# Patient Record
Sex: MALE | Race: WHITE | NOT HISPANIC OR LATINO | URBAN - METROPOLITAN AREA
[De-identification: names, ages, dates, MRNs, and addresses within clinical notes are randomized per-mention and may not be internally consistent; named-entity substitution may affect disease eponyms.]

---

## 2015-08-05 RX ORDER — ALLOPURINOL 300 MG
3 TABLET ORAL
Qty: 0 | Refills: 0 | COMMUNITY
Start: 2015-08-05

## 2015-08-05 RX ORDER — RISPERIDONE 4 MG/1
1 TABLET ORAL
Qty: 0 | Refills: 0 | COMMUNITY
Start: 2015-08-05

## 2017-12-28 ENCOUNTER — INPATIENT (INPATIENT)
Facility: HOSPITAL | Age: 82
LOS: 11 days | Discharge: HOME | DRG: 281 | End: 2018-01-09
Attending: INTERNAL MEDICINE | Admitting: INTERNAL MEDICINE
Payer: COMMERCIAL

## 2017-12-28 VITALS
OXYGEN SATURATION: 96 % | RESPIRATION RATE: 19 BRPM | SYSTOLIC BLOOD PRESSURE: 144 MMHG | DIASTOLIC BLOOD PRESSURE: 75 MMHG | HEART RATE: 51 BPM

## 2017-12-28 DIAGNOSIS — I21.4 NON-ST ELEVATION (NSTEMI) MYOCARDIAL INFARCTION: ICD-10-CM

## 2017-12-28 LAB
ALBUMIN SERPL ELPH-MCNC: 3.5 G/DL — SIGNIFICANT CHANGE UP (ref 3.3–5)
ALP SERPL-CCNC: 69 U/L — SIGNIFICANT CHANGE UP (ref 40–120)
ALT FLD-CCNC: 12 U/L RC — SIGNIFICANT CHANGE UP (ref 10–45)
ANION GAP SERPL CALC-SCNC: 10 MMOL/L — SIGNIFICANT CHANGE UP (ref 5–17)
AST SERPL-CCNC: 23 U/L — SIGNIFICANT CHANGE UP (ref 10–40)
BASOPHILS # BLD AUTO: 0 K/UL — SIGNIFICANT CHANGE UP (ref 0–0.2)
BASOPHILS NFR BLD AUTO: 0.3 % — SIGNIFICANT CHANGE UP (ref 0–2)
BILIRUB SERPL-MCNC: 1.2 MG/DL — SIGNIFICANT CHANGE UP (ref 0.2–1.2)
BUN SERPL-MCNC: 10 MG/DL — SIGNIFICANT CHANGE UP (ref 7–23)
CALCIUM SERPL-MCNC: 9.1 MG/DL — SIGNIFICANT CHANGE UP (ref 8.4–10.5)
CHLORIDE SERPL-SCNC: 99 MMOL/L — SIGNIFICANT CHANGE UP (ref 96–108)
CO2 SERPL-SCNC: 28 MMOL/L — SIGNIFICANT CHANGE UP (ref 22–31)
CREAT SERPL-MCNC: 0.63 MG/DL — SIGNIFICANT CHANGE UP (ref 0.5–1.3)
EOSINOPHIL # BLD AUTO: 0 K/UL — SIGNIFICANT CHANGE UP (ref 0–0.5)
EOSINOPHIL NFR BLD AUTO: 0.4 % — SIGNIFICANT CHANGE UP (ref 0–6)
GLUCOSE SERPL-MCNC: 169 MG/DL — HIGH (ref 70–99)
HCT VFR BLD CALC: 41.2 % — SIGNIFICANT CHANGE UP (ref 39–50)
HGB BLD-MCNC: 13.6 G/DL — SIGNIFICANT CHANGE UP (ref 13–17)
LYMPHOCYTES # BLD AUTO: 1.6 K/UL — SIGNIFICANT CHANGE UP (ref 1–3.3)
LYMPHOCYTES # BLD AUTO: 15.5 % — SIGNIFICANT CHANGE UP (ref 13–44)
MCHC RBC-ENTMCNC: 32.5 PG — SIGNIFICANT CHANGE UP (ref 27–34)
MCHC RBC-ENTMCNC: 33.1 GM/DL — SIGNIFICANT CHANGE UP (ref 32–36)
MCV RBC AUTO: 98.2 FL — SIGNIFICANT CHANGE UP (ref 80–100)
MONOCYTES # BLD AUTO: 0.7 K/UL — SIGNIFICANT CHANGE UP (ref 0–0.9)
MONOCYTES NFR BLD AUTO: 6.9 % — SIGNIFICANT CHANGE UP (ref 2–14)
NEUTROPHILS # BLD AUTO: 8 K/UL — HIGH (ref 1.8–7.4)
NEUTROPHILS NFR BLD AUTO: 76.9 % — SIGNIFICANT CHANGE UP (ref 43–77)
PLATELET # BLD AUTO: 274 K/UL — SIGNIFICANT CHANGE UP (ref 150–400)
POTASSIUM SERPL-MCNC: 4.6 MMOL/L — SIGNIFICANT CHANGE UP (ref 3.5–5.3)
POTASSIUM SERPL-SCNC: 4.6 MMOL/L — SIGNIFICANT CHANGE UP (ref 3.5–5.3)
PROT SERPL-MCNC: 6.3 G/DL — SIGNIFICANT CHANGE UP (ref 6–8.3)
RBC # BLD: 4.19 M/UL — LOW (ref 4.2–5.8)
RBC # FLD: 15 % — HIGH (ref 10.3–14.5)
SODIUM SERPL-SCNC: 137 MMOL/L — SIGNIFICANT CHANGE UP (ref 135–145)
TROPONIN T SERPL-MCNC: 0.03 NG/ML — SIGNIFICANT CHANGE UP (ref 0–0.06)
TROPONIN T SERPL-MCNC: 0.12 NG/ML — HIGH (ref 0–0.06)
WBC # BLD: 10.4 K/UL — SIGNIFICANT CHANGE UP (ref 3.8–10.5)
WBC # FLD AUTO: 10.4 K/UL — SIGNIFICANT CHANGE UP (ref 3.8–10.5)

## 2017-12-28 PROCEDURE — 99223 1ST HOSP IP/OBS HIGH 75: CPT

## 2017-12-28 PROCEDURE — 93010 ELECTROCARDIOGRAM REPORT: CPT

## 2017-12-28 PROCEDURE — 71010: CPT | Mod: 26

## 2017-12-28 PROCEDURE — 99497 ADVNCD CARE PLAN 30 MIN: CPT | Mod: 25

## 2017-12-28 PROCEDURE — 99285 EMERGENCY DEPT VISIT HI MDM: CPT | Mod: 25

## 2017-12-28 RX ORDER — HEPARIN SODIUM 5000 [USP'U]/ML
5000 INJECTION INTRAVENOUS; SUBCUTANEOUS ONCE
Qty: 0 | Refills: 0 | Status: COMPLETED | OUTPATIENT
Start: 2017-12-28 | End: 2017-12-28

## 2017-12-28 RX ORDER — HEPARIN SODIUM 5000 [USP'U]/ML
6000 INJECTION INTRAVENOUS; SUBCUTANEOUS EVERY 6 HOURS
Qty: 0 | Refills: 0 | Status: DISCONTINUED | OUTPATIENT
Start: 2017-12-28 | End: 2018-01-01

## 2017-12-28 RX ORDER — HEPARIN SODIUM 5000 [USP'U]/ML
INJECTION INTRAVENOUS; SUBCUTANEOUS
Qty: 25000 | Refills: 0 | Status: DISCONTINUED | OUTPATIENT
Start: 2017-12-28 | End: 2018-01-01

## 2017-12-28 RX ORDER — ATORVASTATIN CALCIUM 80 MG/1
80 TABLET, FILM COATED ORAL AT BEDTIME
Qty: 0 | Refills: 0 | Status: DISCONTINUED | OUTPATIENT
Start: 2017-12-28 | End: 2018-01-09

## 2017-12-28 RX ORDER — ASPIRIN/CALCIUM CARB/MAGNESIUM 324 MG
325 TABLET ORAL ONCE
Qty: 0 | Refills: 0 | Status: COMPLETED | OUTPATIENT
Start: 2017-12-28 | End: 2017-12-28

## 2017-12-28 RX ORDER — CLOPIDOGREL BISULFATE 75 MG/1
300 TABLET, FILM COATED ORAL ONCE
Qty: 0 | Refills: 0 | Status: COMPLETED | OUTPATIENT
Start: 2017-12-28 | End: 2017-12-28

## 2017-12-28 RX ORDER — ALBUTEROL 90 UG/1
5 AEROSOL, METERED ORAL ONCE
Qty: 0 | Refills: 0 | Status: COMPLETED | OUTPATIENT
Start: 2017-12-28 | End: 2017-12-28

## 2017-12-28 RX ORDER — METOPROLOL TARTRATE 50 MG
12.5 TABLET ORAL
Qty: 0 | Refills: 0 | Status: DISCONTINUED | OUTPATIENT
Start: 2017-12-28 | End: 2017-12-28

## 2017-12-28 RX ADMIN — HEPARIN SODIUM 1000 UNIT(S)/HR: 5000 INJECTION INTRAVENOUS; SUBCUTANEOUS at 20:19

## 2017-12-28 RX ADMIN — ATORVASTATIN CALCIUM 80 MILLIGRAM(S): 80 TABLET, FILM COATED ORAL at 22:29

## 2017-12-28 RX ADMIN — ALBUTEROL 5 MILLIGRAM(S): 90 AEROSOL, METERED ORAL at 12:43

## 2017-12-28 RX ADMIN — Medication 325 MILLIGRAM(S): at 16:57

## 2017-12-28 RX ADMIN — HEPARIN SODIUM 5000 UNIT(S): 5000 INJECTION INTRAVENOUS; SUBCUTANEOUS at 20:20

## 2017-12-28 RX ADMIN — CLOPIDOGREL BISULFATE 300 MILLIGRAM(S): 75 TABLET, FILM COATED ORAL at 19:33

## 2017-12-28 NOTE — ED PROVIDER NOTE - NS_ ATTENDINGSCRIBEDETAILS _ED_A_ED_FT
Awa Arnold MD - Attending Physician: The scribe's documentation has been prepared under my direction and personally reviewed by me in its entirety. I confirm that the note above accurately reflects all work, treatment, procedures, and medical decision making performed by me.

## 2017-12-28 NOTE — H&P ADULT - NSHPLABSRESULTS_GEN_ALL_CORE
Personally reviewed labs and noted in detail below    Personally reviewed CXR: no appreciable focal consolidation or pulm edema    Personally reviewed EKG Personally reviewed labs and noted in detail below    Personally reviewed CXR: no appreciable focal consolidation or pulm edema    Personally reviewed EKG: Afib

## 2017-12-28 NOTE — H&P ADULT - PROBLEM SELECTOR PLAN 5
Per son, mood has been stable. Mental status at baseline  Continue outpatient regimen of valproic acid and risperdal Per son, mood has been stable. Mental status at baseline  Continue outpatient regimen of valproic acid and risperdal  Patient nonambulatory at baseline

## 2017-12-28 NOTE — H&P ADULT - ASSESSMENT
83 yo man with PMH of Afib on Eliquis, HTN, reported CHF?, gout, dementia with reported psychosis presenting from nursing home after an episode of chest pain with elevated CE concerning for NSTEMI

## 2017-12-28 NOTE — H&P ADULT - ATTENDING COMMENTS
Dr. Jomar Francois accepted patient's case from the ED and requested in house hospitalist team to complete admission. Patient previously unknown to me and I was assigned to case. Case discussed with overnight NP covering [facility] [place] [id number]

## 2017-12-28 NOTE — H&P ADULT - HISTORY OF PRESENT ILLNESS
81 yo man with PMH of Afib on Eliquis, HTN, reported CHF?, gout, dementia with reported psychosis presenting from nursing home after an episode of chest pain since this am. Per son, patient was eating breakfast and had episodes of nonbloody, nonbilious emesis of undigested food. Patient was then complaining of left sided chest discomfort on the side: described as a dull pressure. No reported SOB, palpitation. Patient was reported to be given Nitroglycerin tabs x2 without relief and transferred to [facility] for further evaluation. Of note patient mostly bedbound/wheelchair bound and has been at nursing home facility the since 2016.

## 2017-12-28 NOTE — H&P ADULT - PROBLEM SELECTOR PLAN 2
Patient with episode of bradycardia in the 40s. BPs stable. No change of mental status.  Will Place pacer pad  Consider EP consult in AM  Will hold AV pablo blockers at this time- discussed with cardiology Patient with episode of bradycardia in the 40s. BPs stable. No change of mental status.  Consider EP consult in AM  Will hold AV pablo blockers at this time- discussed with cardiology

## 2017-12-28 NOTE — ED ADULT NURSE NOTE - ED STAT RN HANDOFF DETAILS
hand off given to oncoming RN's Noah Ahn and Delmar Giles. Awaiting dispo. fall risk precautions maintained

## 2017-12-28 NOTE — H&P ADULT - PROBLEM SELECTOR PLAN 1
Patient with chest discomfort yet states currently without pressure  Cardiology recommendations appreciated:   Patient s/p Aspirin, Plavix load and initiated with heparin gtt on ACS protocol  Continue with Aspirin 81 and Plavix 75  Continue with Atorvastatin 80  Trend CE q6h. Repeat CE elevated and updated cardiology nocturnist, Dr. Chen: Continue medical management at this time.  Serial EKG if progression of chest discomfort  NPO for potential cath

## 2017-12-28 NOTE — ED ADULT NURSE NOTE - OBJECTIVE STATEMENT
1150 82 yr old WM brought to ER via ambulance on stretcher from nursing home for further eval and tx of c/o left sided chest pain since this morning. A&Ox2.  Confused. Hx of dementia. Wearing diaper. monitor applied. EKG done. Tachypneic, coughing. Expiratory wheezes audible. Dr Kerry holder pt. IVL inserted. Blood work sent

## 2017-12-28 NOTE — H&P ADULT - PROBLEM SELECTOR PLAN 6
Discussed guarded prognosis with patient's son.  Patient's son, Baltazar Mcelroy Jr is HCP and shares HCP with sister  HCP proxy form in chart  Currently patient full code. Family as yet to discuss goals of care. Son would like patient to undergo management that would be beneficial for patient. Discussed guarded prognosis with patient's son.  Patient's son, Baltazar Mcelroy Jr is HCP and shares HCP with sister  HCP proxy form in chart  Per son, would like patient to undergo management that would be beneficial and most comfortable for patient. Per son, would like to optimize quality of life for patient.   If patient were to be unresponsive, or signs of cardiac or respiratory arrest, patient is DNR/DNI.  Patient's son would like to speak with cardiology further about options of management for patient  DNR Form signed and discussion witnessed by overnight RN.   Times spent ~30 min

## 2017-12-28 NOTE — ED PROVIDER NOTE - PROGRESS NOTE DETAILS
Elevated troponin, repeat EKG, aspirin, consult cards, Seen by cardiology , hold heparin Plavix at this time. Will wait rest for rest of cardiology labs. Admit to hospitalist cardiology f/u Spoke with Dr Francois. Accepts admission

## 2017-12-28 NOTE — CONSULT NOTE ADULT - ASSESSMENT
A/P:   82 M hx of Afib, HTN, gout, dementia, psychosis, at baseline poor historian p/w atypical chest pain, however with mild troponin leak of 0.12. His EKG is notable for Afib with HR of 83, with RBBB. His exam is not concerning for CHF exacerbation.     Plan  - would await full set of cardiac enzymes (CE)  - would obtain pro-BNP   -please order TTE  - will follow up CE and assess need for plavix and heparin gtt.     Samuel Crespo MD   Cardiology Fellow  [id number] A/P:   82 M hx of Afib, HTN, gout, dementia, psychosis, at baseline poor historian p/w atypical chest pain, however with mildly positive CE. His EKG is notable for Afib with HR of 83, with RBBB. His exam is not concerning for CHF exacerbation. Given poor history, would treat it as ACS-NSTEMI at this time.     Plan  - s/p  mg in ER   - load with Plavix and heparin bolus and gtt per ACS protocol   - start Atorvastatin 80 mg   - start Lopressor 12.5 mg BID.   - admit to tele  - trend serial CE  - obtain TTE in AM  - if recurring symptoms o/n or with hemodynamic instability please page cardiology     Samuel Crespo MD   Cardiology Fellow  [id number]

## 2017-12-28 NOTE — ED PROVIDER NOTE - CARE PLAN
Principal Discharge DX:	NSTEMI (non-ST elevated myocardial infarction)  Secondary Diagnosis:	Chest pain, unspecified type

## 2017-12-28 NOTE — H&P ADULT - RS GEN PE MLT RESP DETAILS PC
good air movement/airway patent/breath sounds equal/diminished breath sounds, L/diminished breath sounds, R/respirations non-labored

## 2017-12-28 NOTE — H&P ADULT - MENTAL STATUS
Alert to self, states in hospital but does not know which, states month, gets year correct on 3rd attempt

## 2017-12-28 NOTE — CONSULT NOTE ADULT - SUBJECTIVE AND OBJECTIVE BOX
Patient seen and evaluated at bedside    Chief Complaint: chest pain     HPI:    This is a 82 M hx of Afib, HTN, gout, dementia, psychosis who presents from a nursing home facility after reporting of chest pain to the staff this AM. Patient was given nitro for his chest pain at the facility and did not improve his pain. Pt reportedly had an epsiode of nausea as well.    Here in the ER, patient states that he has 2 days of chest pain, constant, dull ache. Patient states that the pain is reproducible when I press on the chest wall. He denies any pain with activity, he denies any SOB, n/v, abd pain, diaphoresis, LE swelling otherwise. Patient is unable oriented to self and place, but is otherwise unable to provide much of a medical hx.     PMH: as above   OA (osteoarthritis)  Obesity  Gout  Afib      PSH:   H/O right knee surgery      Medications:   listed on arrival in chart are:   Allopurinol   Eliquis   Valproic acid   Lasix 20 mg daily   Risperdone 0.5 mg   Vitamin D       Allergies:  No Known Allergies      FAMILY HISTORY:  Family history of rectal cancer (Child)      Social History:  Smoking History: n/a   Alcohol Use: n/a   Drug Use: n/a     Review of Systems:  REVIEW OF SYSTEMS: please see HPI         Physical Exam:  T(F): 97.6 (12-28), Max: 97.9 (12-28)  HR: 71 (12-28) (51 - 71)  BP: 121/70 (12-28) (121/70 - 145/75)  RR: 22 (12-28)  SpO2: 100% (12-28)    GENERAL: No acute distress, well-developed  HEAD:  Atraumatic, Normocephalic  ENT: EOMI, PERRLA, conjunctiva and sclera clear, Neck supple, No JVD, moist mucosa  CHEST/LUNG: decreased breath sounds at bases.   BACK: No spinal tenderness  HEART: Regular rate and rhythm; No murmurs, rubs, or gallops  ABDOMEN: Soft, Nontender, Nondistended; Bowel sounds present  EXTREMITIES:  No clubbing, cyanosis, or edema  PSYCH: Nl behavior, nl affect  NEUROLOGY: AAOx3, non-focal, cranial nerves intact  SKIN: Normal color, No rashes or lesions  LINES:    Cardiovascular Diagnostic Testing:    ECG: Personally reviewed  Afib with HR of 83, with RBBB    Echo: none     Stress Testing: none     Cath:  none     Interpretation of Telemetry: Afib     Imaging:    Labs: Personally reviewed                        13.6   10.4  )-----------( 274      ( 28 Dec 2017 12:11 )             41.2     12-28    137  |  99  |  10  ----------------------------<  169<H>  4.6   |  28  |  0.63    Ca    9.1      28 Dec 2017 12:11    TPro  6.3  /  Alb  3.5  /  TBili  1.2  /  DBili  x   /  AST  23  /  ALT  12  /  AlkPhos  69  12-28      CARDIAC MARKERS ( 28 Dec 2017 16:13 )  x     / 0.12 ng/mL / x     / x     / x      CARDIAC MARKERS ( 28 Dec 2017 12:11 )  x     / 0.03 ng/mL / x     / x     / x Patient seen and evaluated at bedside    Chief Complaint: chest pain     HPI:    This is a 82 M hx of Afib, HTN, gout, dementia, psychosis who presents from a nursing home facility after reporting of chest pain to the staff this AM. Patient was given nitro for his chest pain at the facility and did not improve his pain. Pt reportedly had an epsiode of nausea as well.    Here in the ER, patient states that he has 2 days of chest pain, constant, dull ache. Patient states that the pain is reproducible when I press on the chest wall. He denies any pain with activity, he denies any SOB, n/v, abd pain, diaphoresis, LE swelling otherwise. Patient is unable oriented to self and place, but is otherwise unable to provide much of a medical hx.     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 No nausea, vomiting, or hematemesis; No diarrhea or constipation. No melena or hematochezia.  GENITOURINARY: No dysuria, frequency or hematuria  NEUROLOGICAL: No numbness or weakness  SKIN: No itching, burning, rashes, or lesions   All other review of systems is negative unless indicated above.          Physical Exam:  T(F): 97.6 (12-28), Max: 97.9 (12-28)  HR: 71 (12-28) (51 - 71)  BP: 121/70 (12-28) (121/70 - 145/75)  RR: 22 (12-28)  SpO2: 100% (12-28)    GENERAL: No acute distress, well-developed  HEAD:  Atraumatic, Normocephalic  ENT: EOMI, PERRLA, conjunctiva and sclera clear, Neck supple, No JVD, moist mucosa  CHEST/LUNG: decreased breath sounds at bases.   BACK: No spinal tenderness  HEART: Regular rate and rhythm; No murmurs, rubs, or gallops  ABDOMEN: Soft, Nontender, Nondistended; Bowel sounds present  EXTREMITIES:  No clubbing, cyanosis, or edema  PSYCH: Nl behavior, nl affect  NEUROLOGY: AAOx3, non-focal, cranial nerves intact  SKIN: Normal color, No rashes or lesions  LINES:    Cardiovascular Diagnostic Testing:    ECG: Personally reviewed  Afib with HR of 83, with RBBB    Echo: none     Stress Testing: none     Cath:  none     Interpretation of Telemetry: Afib     Imaging:    Labs: Personally reviewed                        13.6   10.4  )-----------( 274      ( 28 Dec 2017 12:11 )             41.2     12-28    137  |  99  |  10  ----------------------------<  169<H>  4.6   |  28  |  0.63    Ca    9.1      28 Dec 2017 12:11    TPro  6.3  /  Alb  3.5  /  TBili  1.2  /  DBili  x   /  AST  23  /  ALT  12  /  AlkPhos  69  12-28      CARDIAC MARKERS ( 28 Dec 2017 16:13 )  x     / 0.12 ng/mL / x     / x     / x      CARDIAC MARKERS ( 28 Dec 2017 12:11 )  x     / 0.03 ng/mL / x     / x     / x

## 2017-12-28 NOTE — ED PROVIDER NOTE - OBJECTIVE STATEMENT
83 y/o M pt with PMHx of dementia, a-fib c/o left sided dull CP. Pt resides at a nursing home and came to the ED by EMS. Denies vomiting, SOB or any other complaints.

## 2017-12-28 NOTE — H&P ADULT - COMMENTS
T 98 /69 HR 58 RR 20 SpO2 99% Attempted to obtain full ROS with patient. some aspects ROS obtained from son T 98 /69 HR 58 RR 20 SpO2 99% On Tele: SR 40s-50s

## 2017-12-28 NOTE — H&P ADULT - PROBLEM SELECTOR PLAN 4
Patient on Eliquis as outpatient  Will hold for now as patient on heparin gtt  Monitor coags  Monitor on tele

## 2017-12-29 DIAGNOSIS — I21.4 NON-ST ELEVATION (NSTEMI) MYOCARDIAL INFARCTION: ICD-10-CM

## 2017-12-29 DIAGNOSIS — M10.9 GOUT, UNSPECIFIED: ICD-10-CM

## 2017-12-29 DIAGNOSIS — I48.91 UNSPECIFIED ATRIAL FIBRILLATION: ICD-10-CM

## 2017-12-29 DIAGNOSIS — F03.91 UNSPECIFIED DEMENTIA WITH BEHAVIORAL DISTURBANCE: ICD-10-CM

## 2017-12-29 DIAGNOSIS — Z71.89 OTHER SPECIFIED COUNSELING: ICD-10-CM

## 2017-12-29 DIAGNOSIS — R00.1 BRADYCARDIA, UNSPECIFIED: ICD-10-CM

## 2017-12-29 LAB
ANION GAP SERPL CALC-SCNC: 12 MMOL/L — SIGNIFICANT CHANGE UP (ref 5–17)
APTT BLD: 40.6 SEC — HIGH (ref 27.5–37.4)
APTT BLD: 49.6 SEC — HIGH (ref 27.5–37.4)
APTT BLD: 67 SEC — HIGH (ref 27.5–37.4)
BUN SERPL-MCNC: 11 MG/DL — SIGNIFICANT CHANGE UP (ref 7–23)
CALCIUM SERPL-MCNC: 8.8 MG/DL — SIGNIFICANT CHANGE UP (ref 8.4–10.5)
CHLORIDE SERPL-SCNC: 102 MMOL/L — SIGNIFICANT CHANGE UP (ref 96–108)
CK MB BLD-MCNC: 14.9 % — HIGH (ref 0–3.5)
CK MB BLD-MCNC: 18.8 % — HIGH (ref 0–3.5)
CK MB CFR SERPL CALC: 127.4 NG/ML — HIGH (ref 0–6.7)
CK MB CFR SERPL CALC: 95.5 NG/ML — HIGH (ref 0–6.7)
CK SERPL-CCNC: 643 U/L — HIGH (ref 30–200)
CK SERPL-CCNC: 676 U/L — HIGH (ref 30–200)
CO2 SERPL-SCNC: 25 MMOL/L — SIGNIFICANT CHANGE UP (ref 22–31)
CREAT SERPL-MCNC: 0.61 MG/DL — SIGNIFICANT CHANGE UP (ref 0.5–1.3)
GLUCOSE SERPL-MCNC: 129 MG/DL — HIGH (ref 70–99)
HCT VFR BLD CALC: 37.6 % — LOW (ref 39–50)
HCT VFR BLD CALC: 38 % — LOW (ref 39–50)
HGB BLD-MCNC: 12.8 G/DL — LOW (ref 13–17)
HGB BLD-MCNC: 13 G/DL — SIGNIFICANT CHANGE UP (ref 13–17)
MAGNESIUM SERPL-MCNC: 2.1 MG/DL — SIGNIFICANT CHANGE UP (ref 1.6–2.6)
MCHC RBC-ENTMCNC: 32.9 PG — SIGNIFICANT CHANGE UP (ref 27–34)
MCHC RBC-ENTMCNC: 33.8 GM/DL — SIGNIFICANT CHANGE UP (ref 32–36)
MCHC RBC-ENTMCNC: 33.8 PG — SIGNIFICANT CHANGE UP (ref 27–34)
MCHC RBC-ENTMCNC: 34.6 GM/DL — SIGNIFICANT CHANGE UP (ref 32–36)
MCV RBC AUTO: 97.5 FL — SIGNIFICANT CHANGE UP (ref 80–100)
MCV RBC AUTO: 97.7 FL — SIGNIFICANT CHANGE UP (ref 80–100)
PHOSPHATE SERPL-MCNC: 2.6 MG/DL — SIGNIFICANT CHANGE UP (ref 2.5–4.5)
PLATELET # BLD AUTO: 225 K/UL — SIGNIFICANT CHANGE UP (ref 150–400)
PLATELET # BLD AUTO: 260 K/UL — SIGNIFICANT CHANGE UP (ref 150–400)
POTASSIUM SERPL-MCNC: 3.7 MMOL/L — SIGNIFICANT CHANGE UP (ref 3.5–5.3)
POTASSIUM SERPL-SCNC: 3.7 MMOL/L — SIGNIFICANT CHANGE UP (ref 3.5–5.3)
RBC # BLD: 3.85 M/UL — LOW (ref 4.2–5.8)
RBC # BLD: 3.9 M/UL — LOW (ref 4.2–5.8)
RBC # FLD: 14.8 % — HIGH (ref 10.3–14.5)
RBC # FLD: 15 % — HIGH (ref 10.3–14.5)
SODIUM SERPL-SCNC: 139 MMOL/L — SIGNIFICANT CHANGE UP (ref 135–145)
TROPONIN T SERPL-MCNC: 0.79 NG/ML — HIGH (ref 0–0.06)
TROPONIN T SERPL-MCNC: 1.52 NG/ML — HIGH (ref 0–0.06)
WBC # BLD: 10.4 K/UL — SIGNIFICANT CHANGE UP (ref 3.8–10.5)
WBC # BLD: 11 K/UL — HIGH (ref 3.8–10.5)
WBC # FLD AUTO: 10.4 K/UL — SIGNIFICANT CHANGE UP (ref 3.8–10.5)
WBC # FLD AUTO: 11 K/UL — HIGH (ref 3.8–10.5)

## 2017-12-29 PROCEDURE — 93306 TTE W/DOPPLER COMPLETE: CPT | Mod: 26

## 2017-12-29 PROCEDURE — 93010 ELECTROCARDIOGRAM REPORT: CPT

## 2017-12-29 RX ORDER — RISPERIDONE 4 MG/1
0.5 TABLET ORAL AT BEDTIME
Qty: 0 | Refills: 0 | Status: DISCONTINUED | OUTPATIENT
Start: 2017-12-29 | End: 2018-01-09

## 2017-12-29 RX ORDER — LANOLIN ALCOHOL/MO/W.PET/CERES
3 CREAM (GRAM) TOPICAL AT BEDTIME
Qty: 0 | Refills: 0 | Status: DISCONTINUED | OUTPATIENT
Start: 2017-12-29 | End: 2018-01-09

## 2017-12-29 RX ORDER — POTASSIUM CHLORIDE 20 MEQ
40 PACKET (EA) ORAL ONCE
Qty: 0 | Refills: 0 | Status: COMPLETED | OUTPATIENT
Start: 2017-12-29 | End: 2017-12-29

## 2017-12-29 RX ORDER — VALPROIC ACID (AS SODIUM SALT) 250 MG/5ML
250 SOLUTION, ORAL ORAL AT BEDTIME
Qty: 0 | Refills: 0 | Status: DISCONTINUED | OUTPATIENT
Start: 2017-12-29 | End: 2018-01-09

## 2017-12-29 RX ORDER — VALPROIC ACID (AS SODIUM SALT) 250 MG/5ML
125 SOLUTION, ORAL ORAL
Qty: 0 | Refills: 0 | Status: DISCONTINUED | OUTPATIENT
Start: 2017-12-29 | End: 2018-01-09

## 2017-12-29 RX ORDER — ASPIRIN/CALCIUM CARB/MAGNESIUM 324 MG
81 TABLET ORAL DAILY
Qty: 0 | Refills: 0 | Status: DISCONTINUED | OUTPATIENT
Start: 2017-12-29 | End: 2018-01-09

## 2017-12-29 RX ORDER — CLOPIDOGREL BISULFATE 75 MG/1
75 TABLET, FILM COATED ORAL DAILY
Qty: 0 | Refills: 0 | Status: DISCONTINUED | OUTPATIENT
Start: 2017-12-29 | End: 2018-01-09

## 2017-12-29 RX ORDER — ALLOPURINOL 300 MG
300 TABLET ORAL DAILY
Qty: 0 | Refills: 0 | Status: DISCONTINUED | OUTPATIENT
Start: 2017-12-29 | End: 2018-01-09

## 2017-12-29 RX ADMIN — ATORVASTATIN CALCIUM 80 MILLIGRAM(S): 80 TABLET, FILM COATED ORAL at 23:12

## 2017-12-29 RX ADMIN — Medication 300 MILLIGRAM(S): at 11:23

## 2017-12-29 RX ADMIN — HEPARIN SODIUM 1000 UNIT(S)/HR: 5000 INJECTION INTRAVENOUS; SUBCUTANEOUS at 03:06

## 2017-12-29 RX ADMIN — HEPARIN SODIUM 1400 UNIT(S)/HR: 5000 INJECTION INTRAVENOUS; SUBCUTANEOUS at 20:07

## 2017-12-29 RX ADMIN — Medication 40 MILLIEQUIVALENT(S): at 07:04

## 2017-12-29 RX ADMIN — RISPERIDONE 0.5 MILLIGRAM(S): 4 TABLET ORAL at 23:12

## 2017-12-29 RX ADMIN — HEPARIN SODIUM 1200 UNIT(S)/HR: 5000 INJECTION INTRAVENOUS; SUBCUTANEOUS at 12:21

## 2017-12-29 RX ADMIN — CLOPIDOGREL BISULFATE 75 MILLIGRAM(S): 75 TABLET, FILM COATED ORAL at 11:23

## 2017-12-29 RX ADMIN — Medication 125 MILLIGRAM(S): at 17:16

## 2017-12-29 RX ADMIN — Medication 81 MILLIGRAM(S): at 11:23

## 2017-12-29 RX ADMIN — Medication 125 MILLIGRAM(S): at 06:25

## 2017-12-29 RX ADMIN — Medication 250 MILLIGRAM(S): at 23:12

## 2017-12-29 NOTE — DIETITIAN INITIAL EVALUATION ADULT. - OTHER INFO
seen for BMI >40. BMI 29.5. son at bedside. NPO at time time of visit. has an appetite and requesting food.  able to manage interview effectively. missing some bottom teeth and his lower bridge is broken, therefore needs Soft foods. refused need for supplements. diet ed not indicated as pt lives in NH where he is provided no added salt/chopped meals. he takes VitD 3 PTA. last BM PTA. NKFA

## 2017-12-29 NOTE — PROGRESS NOTE ADULT - SUBJECTIVE AND OBJECTIVE BOX
Brief Cardiology Note:    -Please keep NPO for [facility] this AM    Tushar Chen MD  Cardiology Attending  [facility]   Cell: [phone]  (Cardiology Nocturnist cell number available 7 pm - 7 am every night; available daytime week days for follow-up only; daytime weekends covered by general cardiology consult service)

## 2017-12-29 NOTE — CHART NOTE - NSCHARTNOTEFT_GEN_A_CORE
I spoke with Mr.Barnett Richmond (son and HCP) at bedside, this AM the family has collectively arrived to not proceed with angiography given pt's current functional status and cognitive decline. They are ok with medical management. Pt' s son has also made it clear that they would like to make the patient DNR/ DNI.   I communicated this with primary team.

## 2017-12-29 NOTE — DIETITIAN INITIAL EVALUATION ADULT. - PROBLEM SELECTOR PLAN 6
Discussed guarded prognosis with patient's son.  Patient's son, Baltazar Mcelroy Jr is HCP and shares HCP with sister  HCP proxy form in chart  Per son, would like patient to undergo management that would be beneficial and most comfortable for patient. Per son, would like to optimize quality of life for patient.   If patient were to be unresponsive, or signs of cardiac or respiratory arrest, patient is DNR/DNI.  Patient's son would like to speak with cardiology further about options of management for patient  DNR Form signed and discussion witnessed by overnight RN.   Times spent ~30 min

## 2017-12-29 NOTE — DIETITIAN INITIAL EVALUATION ADULT. - PROBLEM SELECTOR PLAN 5
Per son, mood has been stable. Mental status at baseline  Continue outpatient regimen of valproic acid and risperdal  Patient nonambulatory at baseline

## 2017-12-29 NOTE — PROGRESS NOTE ADULT - SUBJECTIVE AND OBJECTIVE BOX
Patient is a 82y old  Male who presents with a chief complaint of chest pain (28 Dec 2017 23:25)      SUBJECTIVE / OVERNIGHT EVENTS:   Feels better.  Denies CP/SOB/Palpitation/HA.    MEDICATIONS  (STANDING):  allopurinol 300 milliGRAM(s) Oral daily  aspirin enteric coated 81 milliGRAM(s) Oral daily  atorvastatin 80 milliGRAM(s) Oral at bedtime  clopidogrel Tablet 75 milliGRAM(s) Oral daily  heparin  Infusion.  Unit(s)/Hr (10 mL/Hr) IV Continuous <Continuous>  risperiDONE   Tablet 0.5 milliGRAM(s) Oral at bedtime  valproic  acid Syrup 125 milliGRAM(s) Oral two times a day  valproic  acid Syrup 250 milliGRAM(s) Oral at bedtime    MEDICATIONS  (PRN):  heparin  Injectable 6000 Unit(s) IV Push every 6 hours PRN For aPTT less than 40  melatonin 3 milliGRAM(s) Oral at bedtime PRN Insomnia        CAPILLARY BLOOD GLUCOSE        I&O's Summary    28 Dec 2017 07:01  -  29 Dec 2017 07:00  --------------------------------------------------------  IN: 160 mL / OUT: 0 mL / NET: 160 mL    29 Dec 2017 07:01  -  29 Dec 2017 21:38  --------------------------------------------------------  IN: 494 mL / OUT: 100 mL / NET: 394 mL        PHYSICAL EXAM:  GENERAL: NAD, well-developed  HEAD:  Atraumatic, Normocephalic  NECK: Supple, No JVD  CHEST/LUNG: Clear to auscultation bilaterally; No wheezing.  HEART: Regular rate and rhythm; No murmurs, rubs, or gallops  ABDOMEN: Soft, Nontender, Nondistended; Bowel sounds present  EXTREMITIES:   No clubbing, cyanosis, or edema  NEUROLOGY: AAO X 3  SKIN: No rashes    LABS:                        13.0   10.4  )-----------( 225      ( 29 Dec 2017 05:24 )             37.6     12-29    139  |  102  |  11  ----------------------------<  129<H>  3.7   |  25  |  0.61    Ca    8.8      29 Dec 2017 05:24  Phos  2.6     12-29  Mg     2.1     12-29    TPro  6.3  /  Alb  3.5  /  TBili  1.2  /  DBili  x   /  AST  23  /  ALT  12  /  AlkPhos  69  12-28    PTT - ( 29 Dec 2017 19:23 )  PTT:40.6 sec  CARDIAC MARKERS ( 29 Dec 2017 05:24 )  x     / 1.52 ng/mL / 643 U/L / x     / 95.5 ng/mL  CARDIAC MARKERS ( 28 Dec 2017 23:50 )  x     / 0.79 ng/mL / 676 U/L / x     / 127.4 ng/mL  CARDIAC MARKERS ( 28 Dec 2017 16:16 )  x     / x     / 196 U/L / x     / 28.1 ng/mL  CARDIAC MARKERS ( 28 Dec 2017 16:13 )  x     / 0.12 ng/mL / x     / x     / x      CARDIAC MARKERS ( 28 Dec 2017 12:11 )  x     / 0.03 ng/mL / x     / x     / x            CAPILLARY BLOOD GLUCOSE                    RADIOLOGY & ADDITIONAL TESTS:    Imaging Personally Reviewed:    Consultant(s) Notes Reviewed:      Care Discussed with Consultants/Other Providers:

## 2017-12-29 NOTE — CHART NOTE - NSCHARTNOTEFT_GEN_A_CORE
Seen and Examined patient in no distress.   continue with current care      Patient is a 82y old  Male who presents with a chief complaint of chest pain (28 Dec 2017 23:25)  SUBJECTIVE / OVERNIGHT EVENTS:    MEDICATIONS  (STANDING):  allopurinol 300 milliGRAM(s) Oral daily  aspirin enteric coated 81 milliGRAM(s) Oral daily  atorvastatin 80 milliGRAM(s) Oral at bedtime  clopidogrel Tablet 75 milliGRAM(s) Oral daily  heparin  Infusion.  Unit(s)/Hr (10 mL/Hr) IV Continuous <Continuous>  risperiDONE   Tablet 0.5 milliGRAM(s) Oral at bedtime  valproic  acid Syrup 125 milliGRAM(s) Oral two times a day  valproic  acid Syrup 250 milliGRAM(s) Oral at bedtime    MEDICATIONS  (PRN):  heparin  Injectable 6000 Unit(s) IV Push every 6 hours PRN For aPTT less than 40  melatonin 3 milliGRAM(s) Oral at bedtime PRN Insomni      CAPILLARY BLOOD GLUCOSE    I&O's Summary    28 Dec 2017 07:01  -  29 Dec 2017 07:00  --------------------------------------------------------  IN: 160 mL / OUT: 0 mL / NET: 160 mL    29 Dec 2017 07:01  -  29 Dec 2017 18:35  --------------------------------------------------------  IN: 290 mL / OUT: 0 mL / NET: 290 mL        LABS:                        13.0   10.4  )-----------( 225      ( 29 Dec 2017 05:24 )             37.6     12-29    139  |  102  |  11  ----------------------------<  129<H>  3.7   |  25  |  0.61    Ca    8.8      29 Dec 2017 05:24  Phos  2.6     12-29  Mg     2.1     12-29    TPro  6.3  /  Alb  3.5  /  TBili  1.2  /  DBili  x   /  AST  23  /  ALT  12  /  AlkPhos  69  12-28    PTT - ( 29 Dec 2017 11:41 )  PTT:49.6 sec  CARDIAC MARKERS ( 29 Dec 2017 05:24 )  x     / 1.52 ng/mL / 643 U/L / x     / 95.5 ng/mL  CARDIAC MARKERS ( 28 Dec 2017 23:50 )  x     / 0.79 ng/mL / 676 U/L / x     / 127.4 ng/mL  CARDIAC MARKERS ( 28 Dec 2017 16:16 )  x     / x     / 196 U/L / x     / 28.1 ng/mL  CARDIAC MARKERS ( 28 Dec 2017 16:13 )  x     / 0.12 ng/mL / x     / x     / x      CARDIAC MARKERS ( 28 Dec 2017 12:11 )  x     / 0.03 ng/mL / x     / x     / x          RADIOLOGY & ADDITIONAL TESTS:    PHYSICAL EXAM:  GENERAL: NAD, well-developed  HEAD:  Atraumatic, Normocephalic  EYES: EOMI, PERRLA, conjunctiva and sclera clear  NECK: Supple, No JVD  CHEST/LUNG: Clear to auscultation bilaterally; No wheeze  HEART: Regular rate and rhythm; No murmurs, rubs, or gallops  ABDOMEN: Soft, Nontender, Nondistended; Bowel sounds present  EXTREMITIES:  2+ Peripheral Pulses, No clubbing, cyanosis, or edema  PSYCH: AAOx3  NEUROLOGY: non-focal  SKIN: No rashes or lesions    A/P:  PAST MEDICAL & SURGICAL HISTORY:  Dementia  OA (osteoarthritis)  Obesity  Gout  Afib  H/O right knee surgery    Discussed with Dr Francois that patient's son refused Cardiac Cath   continue with current management   consider palliative care   see Attending note

## 2017-12-29 NOTE — CHART NOTE - NSCHARTNOTEFT_GEN_A_CORE
Called by RN that, pt had 32 beats of Wide complex Tachycardia on Tele. Called by RN that, pt had 32 beats of Wide complex Tachycardia on Tele at 0500am . Pt was evaluated. Denies CP/SOB/ Palpitations/ Diaphoresis, HA/ Dizziness/ Blurry vision/syncope Called by RN that, pt had 32 beats of Wide complex Tachycardia on Tele at 0500am . Pt was evaluated. Denies SOB/ Palpitations/ Diaphoresis, HA/ Dizziness/ Blurry vision/syncope, fever/chills, Abdominal Pain/N/D/C. Pt is a 83 yo man with PMH of Afib on Eliquis, HTN, reported CHF?, gout, dementia with reported psychosis presenting from nursing home after an episode of chest pain with elevated CE concerning for NSTEMI.   - Monitor on Tele. .  - K/Mg/PO: 3.7/ 2.1/2.6.   - Patient s/p Aspirin, Plavix load and initiated with heparin gtt on ACS protocol  - Continue with Aspirin 81 and Plavix 75  - Continue with Atorvastatin 80  - Trend CE q6h. Repeat CE elevated and updated cardiology nocturnist, Dr. Chen: Continue medical management at this time.  - Serial EKG if progression of chest discomfort  - NPO for C today.   - Spoke to Cardiology, hold off Beta Blockers at this time.   - Will endorse to primary team in am.     CAITLYN. ADIN LOVE,   # [id number]  Medicine PA.

## 2017-12-29 NOTE — DIETITIAN INITIAL EVALUATION ADULT. - PROBLEM SELECTOR PLAN 2
Patient with episode of bradycardia in the 40s. BPs stable. No change of mental status.  Consider EP consult in AM  Will hold AV pablo blockers at this time- discussed with cardiology

## 2017-12-30 LAB
ANION GAP SERPL CALC-SCNC: 12 MMOL/L — SIGNIFICANT CHANGE UP (ref 5–17)
APTT BLD: 29.7 SEC — SIGNIFICANT CHANGE UP (ref 27.5–37.4)
APTT BLD: 56.3 SEC — HIGH (ref 27.5–37.4)
APTT BLD: 75.1 SEC — HIGH (ref 27.5–37.4)
BUN SERPL-MCNC: 14 MG/DL — SIGNIFICANT CHANGE UP (ref 7–23)
CALCIUM SERPL-MCNC: 8.5 MG/DL — SIGNIFICANT CHANGE UP (ref 8.4–10.5)
CHLORIDE SERPL-SCNC: 103 MMOL/L — SIGNIFICANT CHANGE UP (ref 96–108)
CK MB BLD-MCNC: 6.1 % — HIGH (ref 0–3.5)
CK MB CFR SERPL CALC: 11.3 NG/ML — HIGH (ref 0–6.7)
CK SERPL-CCNC: 184 U/L — SIGNIFICANT CHANGE UP (ref 30–200)
CO2 SERPL-SCNC: 25 MMOL/L — SIGNIFICANT CHANGE UP (ref 22–31)
CREAT SERPL-MCNC: 0.72 MG/DL — SIGNIFICANT CHANGE UP (ref 0.5–1.3)
GLUCOSE SERPL-MCNC: 90 MG/DL — SIGNIFICANT CHANGE UP (ref 70–99)
HCT VFR BLD CALC: 39.8 % — SIGNIFICANT CHANGE UP (ref 39–50)
HGB BLD-MCNC: 13.4 G/DL — SIGNIFICANT CHANGE UP (ref 13–17)
MCHC RBC-ENTMCNC: 33.1 PG — SIGNIFICANT CHANGE UP (ref 27–34)
MCHC RBC-ENTMCNC: 33.6 GM/DL — SIGNIFICANT CHANGE UP (ref 32–36)
MCV RBC AUTO: 98.6 FL — SIGNIFICANT CHANGE UP (ref 80–100)
PLATELET # BLD AUTO: 239 K/UL — SIGNIFICANT CHANGE UP (ref 150–400)
POTASSIUM SERPL-MCNC: 3.8 MMOL/L — SIGNIFICANT CHANGE UP (ref 3.5–5.3)
POTASSIUM SERPL-SCNC: 3.8 MMOL/L — SIGNIFICANT CHANGE UP (ref 3.5–5.3)
RBC # BLD: 4.04 M/UL — LOW (ref 4.2–5.8)
RBC # FLD: 15 % — HIGH (ref 10.3–14.5)
SODIUM SERPL-SCNC: 140 MMOL/L — SIGNIFICANT CHANGE UP (ref 135–145)
TROPONIN T SERPL-MCNC: 1.23 NG/ML — HIGH (ref 0–0.06)
WBC # BLD: 9 K/UL — SIGNIFICANT CHANGE UP (ref 3.8–10.5)
WBC # FLD AUTO: 9 K/UL — SIGNIFICANT CHANGE UP (ref 3.8–10.5)

## 2017-12-30 PROCEDURE — 99233 SBSQ HOSP IP/OBS HIGH 50: CPT

## 2017-12-30 RX ADMIN — Medication 125 MILLIGRAM(S): at 18:18

## 2017-12-30 RX ADMIN — Medication 81 MILLIGRAM(S): at 12:21

## 2017-12-30 RX ADMIN — CLOPIDOGREL BISULFATE 75 MILLIGRAM(S): 75 TABLET, FILM COATED ORAL at 12:21

## 2017-12-30 RX ADMIN — HEPARIN SODIUM 1600 UNIT(S)/HR: 5000 INJECTION INTRAVENOUS; SUBCUTANEOUS at 18:20

## 2017-12-30 RX ADMIN — HEPARIN SODIUM 1300 UNIT(S)/HR: 5000 INJECTION INTRAVENOUS; SUBCUTANEOUS at 10:29

## 2017-12-30 RX ADMIN — HEPARIN SODIUM 1400 UNIT(S)/HR: 5000 INJECTION INTRAVENOUS; SUBCUTANEOUS at 03:23

## 2017-12-30 RX ADMIN — RISPERIDONE 0.5 MILLIGRAM(S): 4 TABLET ORAL at 21:40

## 2017-12-30 RX ADMIN — Medication 250 MILLIGRAM(S): at 21:40

## 2017-12-30 RX ADMIN — HEPARIN SODIUM 6000 UNIT(S): 5000 INJECTION INTRAVENOUS; SUBCUTANEOUS at 18:19

## 2017-12-30 RX ADMIN — Medication 300 MILLIGRAM(S): at 12:21

## 2017-12-30 RX ADMIN — ATORVASTATIN CALCIUM 80 MILLIGRAM(S): 80 TABLET, FILM COATED ORAL at 21:40

## 2017-12-30 RX ADMIN — Medication 125 MILLIGRAM(S): at 06:09

## 2017-12-30 NOTE — CHART NOTE - NSCHARTNOTEFT_GEN_A_CORE
Called by RN for patient with 3 second pause on tele - patient is a poor historian - specifically denies CP, palpations, shortness of breath, nausea, abdominal pain.  Event discussed with covering Cardiology Fellow - advised that 3 second pause is not concerning in the setting of afib.    Patient off AV pablo blockers due to bradycardia.  Will continue to monitor on tele.

## 2017-12-30 NOTE — PROGRESS NOTE ADULT - SUBJECTIVE AND OBJECTIVE BOX
==============================================================  *****************CARDIOLOGY PROGRESS NOTE********************  ==============================================================    REFERRING PHYSICIAN:  Jomar Francois    REASON FOR CONSULT:  Chest Pain    CHIEF COMPLAINT:   Patient is a 82y old  Male who presents with a chief complaint of chest pain (28 Dec 2017 23:25)      BRIEF SUMMARY:  82 M h.o. - Gout, Dementia/Psychosis, HTN, AF with RBBB not on AC - p/w NonSTEMI - patient decided for medical management - currently being managed on ASA/Plavix/Hep gtt/Atorva   ================================================  24 HR EVENTS:  - BPs in normal range  - HRs in normal range  - Remains in AF  ================================================  Allergies    No Known Allergies    Intolerances    	    MEDICATIONS:  MEDICATIONS  (STANDING):  allopurinol 300 milliGRAM(s) Oral daily  aspirin enteric coated 81 milliGRAM(s) Oral daily  atorvastatin 80 milliGRAM(s) Oral at bedtime  clopidogrel Tablet 75 milliGRAM(s) Oral daily  heparin  Infusion.  Unit(s)/Hr (10 mL/Hr) IV Continuous <Continuous>  risperiDONE   Tablet 0.5 milliGRAM(s) Oral at bedtime  valproic  acid Syrup 125 milliGRAM(s) Oral two times a day  valproic  acid Syrup 250 milliGRAM(s) Oral at bedtime        PAST MEDICAL & SURGICAL HISTORY:  Dementia  OA (osteoarthritis)  Obesity  Gout  Afib  H/O right knee surgery      FAMILY HISTORY:  Family history of rectal cancer (Child)    NC - Mother/Father    REVIEW OF SYSTEMS: (Unless + Sign Before Symptom, It is Negative)  Constitutional: No Fever, Fatigue, Weight Changes  Eyes: No recent vision changes, eye pain  ENT: No Congestion, Sore Throat  Endocrine: No Excess Sweating, Temperature Intolerance  Cardiovascular: +Chest Pain, Palpitations, SOB, Pre-syncope, Syncope  Respiratory: No Cough, Congestion, Wheezing  GI: No dysuria, hematuria  MS: No Joint Pain, Swelling  Neurologic: No Headaches, Imbalance, Focal Deficits  Skin: No Rashes, Hematoma, Prurpura    PHYSICAL EXAM:  Vital Signs Last 24 Hrs  T(C): 36.8 (30 Dec 2017 17:15), Max: 37.1 (30 Dec 2017 04:33)  T(F): 98.2 (30 Dec 2017 17:15), Max: 98.8 (30 Dec 2017 04:33)  HR: 57 (30 Dec 2017 17:15) (45 - 84)  BP: 102/58 (30 Dec 2017 17:15) (102/58 - 128/69)  BP(mean): --  RR: 18 (30 Dec 2017 17:15) (18 - 19)  SpO2: 100% (30 Dec 2017 17:15) (95% - 100%)      Appearance: Normal; NAD	  HEENT:   NCAT, EOMI	  Cardiovascular: Normal S1 S2, +Irregularly irregular, No JVD, No murmurs, No edema  Respiratory: Lungs clear to auscultation, no use of accessory muscles	  Psychiatry: A & O x 3, +Intermittent delirium  Gastrointestinal:  Soft, Non-tender	  Skin: No rashes, No ecchymoses, No cyanosis	  Neurologic: Non-focal, no speech abnormalities  Extremities: Normal range of motion, No clubbing, cyanosis or edema  Vascular: Peripheral pulses palpable 2+ bilaterally, no prominent varicosities      LABS:	  Labs Reviewed 	12-30-17 @ 17:52    CBC Full  -  ( 30 Dec 2017 09:19 )  WBC Count : 9.0 K/uL  Hemoglobin : 13.4 g/dL  Hematocrit : 39.8 %  Platelet Count - Automated : 239 K/uL  Mean Cell Volume : 98.6 fl  Mean Cell Hemoglobin : 33.1 pg  Mean Cell Hemoglobin Concentration : 33.6 gm/dL  Auto Neutrophil # : x  Auto Lymphocyte # : x  Auto Monocyte # : x  Auto Eosinophil # : x  Auto Basophil # : x  Auto Neutrophil % : x  Auto Lymphocyte % : x  Auto Monocyte % : x  Auto Eosinophil % : x  Auto Basophil % : x    12-30    140  |  103  |  14  ----------------------------<  90  3.8   |  25  |  0.72  12-29    139  |  102  |  11  ----------------------------<  129<H>  3.7   |  25  |  0.61    Ca    8.5      30 Dec 2017 09:19  Ca    8.8      29 Dec 2017 05:24  Phos  2.6     12-29  Mg     2.1     12-29        TELEMETRY: 	    AF  	  =======================================================================================  =======================================================================================  Assessment:  82 M h.o. 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    Please call with questions.      Sarmad Hartley MD [facility]  [phone]

## 2017-12-30 NOTE — PROVIDER CONTACT NOTE (OTHER) - ASSESSMENT
Pt resting comfortably in bed. Denies any CP or SOB. Pt is A&Ox2 at baseline. Pt on heparin gtt. A.Fib on tele monitor. VSS: Temp 97.7 F, /68, HR 45, O2 100% RA.

## 2017-12-30 NOTE — PROVIDER CONTACT NOTE (OTHER) - ASSESSMENT
Patient A&Ox3, confused at times. Patient denies chest pain, lightheadedness, or shortness of breath. HR 40-60 afib on cardiac monitor. HR 55, /77, RR 18, O2 saturation 96% on room air. Patient has R2 pads on.

## 2017-12-30 NOTE — PROGRESS NOTE ADULT - SUBJECTIVE AND OBJECTIVE BOX
Patient is a 82y old  Male who presents with a chief complaint of chest pain (28 Dec 2017 23:25)      SUBJECTIVE / OVERNIGHT EVENTS:   Feels better. 3 Sec. pause on tele.  Denies CP/SOB/Palpitation/HA.    MEDICATIONS  (STANDING):  allopurinol 300 milliGRAM(s) Oral daily  aspirin enteric coated 81 milliGRAM(s) Oral daily  atorvastatin 80 milliGRAM(s) Oral at bedtime  clopidogrel Tablet 75 milliGRAM(s) Oral daily  heparin  Infusion.  Unit(s)/Hr (10 mL/Hr) IV Continuous <Continuous>  risperiDONE   Tablet 0.5 milliGRAM(s) Oral at bedtime  valproic  acid Syrup 125 milliGRAM(s) Oral two times a day  valproic  acid Syrup 250 milliGRAM(s) Oral at bedtime    MEDICATIONS  (PRN):  heparin  Injectable 6000 Unit(s) IV Push every 6 hours PRN For aPTT less than 40  melatonin 3 milliGRAM(s) Oral at bedtime PRN Insomnia        CAPILLARY BLOOD GLUCOSE        I&O's Summary    29 Dec 2017 07:01  -  30 Dec 2017 07:00  --------------------------------------------------------  IN: 494 mL / OUT: 100 mL / NET: 394 mL    30 Dec 2017 07:01  -  30 Dec 2017 18:39  --------------------------------------------------------  IN: 300 mL / OUT: 0 mL / NET: 300 mL        PHYSICAL EXAM:  GENERAL: NAD, well-developed  HEAD:  Atraumatic, Normocephalic  NECK: Supple, No JVD  CHEST/LUNG: Clear to auscultation bilaterally; No wheezing.  HEART: Regular rate and rhythm; No murmurs, rubs, or gallops  ABDOMEN: Soft, Nontender, Nondistended; Bowel sounds present  EXTREMITIES:   No clubbing, cyanosis, or edema  NEUROLOGY: AAO X 3  SKIN: No rashes    LABS:                        13.4   9.0   )-----------( 239      ( 30 Dec 2017 09:19 )             39.8     12-30    140  |  103  |  14  ----------------------------<  90  3.8   |  25  |  0.72    Ca    8.5      30 Dec 2017 09:19  Phos  2.6     12-29  Mg     2.1     12-29      PTT - ( 30 Dec 2017 16:56 )  PTT:29.7 sec  CARDIAC MARKERS ( 30 Dec 2017 09:19 )  x     / 1.23 ng/mL / 184 U/L / x     / 11.3 ng/mL  CARDIAC MARKERS ( 29 Dec 2017 05:24 )  x     / 1.52 ng/mL / 643 U/L / x     / 95.5 ng/mL  CARDIAC MARKERS ( 28 Dec 2017 23:50 )  x     / 0.79 ng/mL / 676 U/L / x     / 127.4 ng/mL        CAPILLARY BLOOD GLUCOSE                    RADIOLOGY & ADDITIONAL TESTS:    Imaging Personally Reviewed:    Consultant(s) Notes Reviewed:      Care Discussed with Consultants/Other Providers:

## 2017-12-30 NOTE — PROVIDER CONTACT NOTE (OTHER) - ASSESSMENT
Pt A&Ox4, forgetful, asymptomatic, VS within baseline, denies chest pain or discomfort. R2 pads on patient. Pt DNR/DNI.

## 2017-12-31 LAB
ANION GAP SERPL CALC-SCNC: 11 MMOL/L — SIGNIFICANT CHANGE UP (ref 5–17)
APTT BLD: 176.4 SEC — CRITICAL HIGH (ref 27.5–37.4)
APTT BLD: 46.3 SEC — HIGH (ref 27.5–37.4)
APTT BLD: 73.4 SEC — HIGH (ref 27.5–37.4)
BUN SERPL-MCNC: 11 MG/DL — SIGNIFICANT CHANGE UP (ref 7–23)
CALCIUM SERPL-MCNC: 8.8 MG/DL — SIGNIFICANT CHANGE UP (ref 8.4–10.5)
CHLORIDE SERPL-SCNC: 105 MMOL/L — SIGNIFICANT CHANGE UP (ref 96–108)
CO2 SERPL-SCNC: 25 MMOL/L — SIGNIFICANT CHANGE UP (ref 22–31)
CREAT SERPL-MCNC: 0.66 MG/DL — SIGNIFICANT CHANGE UP (ref 0.5–1.3)
GLUCOSE SERPL-MCNC: 91 MG/DL — SIGNIFICANT CHANGE UP (ref 70–99)
HCT VFR BLD CALC: 40.3 % — SIGNIFICANT CHANGE UP (ref 39–50)
HGB BLD-MCNC: 13.8 G/DL — SIGNIFICANT CHANGE UP (ref 13–17)
MCHC RBC-ENTMCNC: 33.6 PG — SIGNIFICANT CHANGE UP (ref 27–34)
MCHC RBC-ENTMCNC: 34.3 GM/DL — SIGNIFICANT CHANGE UP (ref 32–36)
MCV RBC AUTO: 97.9 FL — SIGNIFICANT CHANGE UP (ref 80–100)
PLATELET # BLD AUTO: 239 K/UL — SIGNIFICANT CHANGE UP (ref 150–400)
POTASSIUM SERPL-MCNC: 4 MMOL/L — SIGNIFICANT CHANGE UP (ref 3.5–5.3)
POTASSIUM SERPL-SCNC: 4 MMOL/L — SIGNIFICANT CHANGE UP (ref 3.5–5.3)
RBC # BLD: 4.12 M/UL — LOW (ref 4.2–5.8)
RBC # FLD: 14.8 % — HIGH (ref 10.3–14.5)
SODIUM SERPL-SCNC: 141 MMOL/L — SIGNIFICANT CHANGE UP (ref 135–145)
WBC # BLD: 9.9 K/UL — SIGNIFICANT CHANGE UP (ref 3.8–10.5)
WBC # FLD AUTO: 9.9 K/UL — SIGNIFICANT CHANGE UP (ref 3.8–10.5)

## 2017-12-31 PROCEDURE — 99233 SBSQ HOSP IP/OBS HIGH 50: CPT

## 2017-12-31 RX ORDER — SENNA PLUS 8.6 MG/1
2 TABLET ORAL AT BEDTIME
Qty: 0 | Refills: 0 | Status: DISCONTINUED | OUTPATIENT
Start: 2017-12-31 | End: 2018-01-09

## 2017-12-31 RX ORDER — DOCUSATE SODIUM 100 MG
100 CAPSULE ORAL
Qty: 0 | Refills: 0 | Status: DISCONTINUED | OUTPATIENT
Start: 2017-12-31 | End: 2018-01-09

## 2017-12-31 RX ADMIN — SENNA PLUS 2 TABLET(S): 8.6 TABLET ORAL at 21:52

## 2017-12-31 RX ADMIN — HEPARIN SODIUM 1500 UNIT(S)/HR: 5000 INJECTION INTRAVENOUS; SUBCUTANEOUS at 18:01

## 2017-12-31 RX ADMIN — CLOPIDOGREL BISULFATE 75 MILLIGRAM(S): 75 TABLET, FILM COATED ORAL at 10:06

## 2017-12-31 RX ADMIN — Medication 81 MILLIGRAM(S): at 10:06

## 2017-12-31 RX ADMIN — Medication 250 MILLIGRAM(S): at 21:52

## 2017-12-31 RX ADMIN — Medication 100 MILLIGRAM(S): at 21:52

## 2017-12-31 RX ADMIN — Medication 300 MILLIGRAM(S): at 10:06

## 2017-12-31 RX ADMIN — Medication 125 MILLIGRAM(S): at 10:06

## 2017-12-31 RX ADMIN — Medication 125 MILLIGRAM(S): at 18:01

## 2017-12-31 RX ADMIN — ATORVASTATIN CALCIUM 80 MILLIGRAM(S): 80 TABLET, FILM COATED ORAL at 21:52

## 2017-12-31 RX ADMIN — HEPARIN SODIUM 1300 UNIT(S)/HR: 5000 INJECTION INTRAVENOUS; SUBCUTANEOUS at 02:18

## 2017-12-31 RX ADMIN — HEPARIN SODIUM 0 UNIT(S)/HR: 5000 INJECTION INTRAVENOUS; SUBCUTANEOUS at 01:15

## 2017-12-31 RX ADMIN — HEPARIN SODIUM 1300 UNIT(S)/HR: 5000 INJECTION INTRAVENOUS; SUBCUTANEOUS at 10:07

## 2017-12-31 RX ADMIN — RISPERIDONE 0.5 MILLIGRAM(S): 4 TABLET ORAL at 21:52

## 2017-12-31 NOTE — PROGRESS NOTE ADULT - SUBJECTIVE AND OBJECTIVE BOX
==============================================================  *****************CARDIOLOGY PROGRESS NOTE********************  ==============================================================    REFERRING PHYSICIAN:  Jomar Francois    REASON FOR CONSULT:  Chest Pain    CHIEF COMPLAINT:   Patient is a 82y old  Male who presents with a chief complaint of chest pain (28 Dec 2017 23:25)      BRIEF SUMMARY:  82 M h.o. - Gout, Dementia/Psychosis, HTN, AF with RBBB not on AC - p/w NonSTEMI - patient decided for medical management - currently being managed on ASA/Plavix/Hep gtt/Atorva   ================================================  24 HR EVENTS:  - BPs in normal range  - HRs in normal range ; although one pause   - Remains in AF  ================================================  Allergies    No Known Allergies    Intolerances    	    MEDICATIONS:  MEDICATIONS  (STANDING):  allopurinol 300 milliGRAM(s) Oral daily  aspirin enteric coated 81 milliGRAM(s) Oral daily  atorvastatin 80 milliGRAM(s) Oral at bedtime  clopidogrel Tablet 75 milliGRAM(s) Oral daily  heparin  Infusion.  Unit(s)/Hr (10 mL/Hr) IV Continuous <Continuous>  risperiDONE   Tablet 0.5 milliGRAM(s) Oral at bedtime  valproic  acid Syrup 125 milliGRAM(s) Oral two times a day  valproic  acid Syrup 250 milliGRAM(s) Oral at bedtime        PAST MEDICAL & SURGICAL HISTORY:  Dementia  OA (osteoarthritis)  Obesity  Gout  Afib  H/O right knee surgery      FAMILY HISTORY:  Family history of rectal cancer (Child)    NC - Mother/Father    REVIEW OF SYSTEMS: (Unless + Sign Before Symptom, It is Negative)  Constitutional: No Fever, Fatigue, Weight Changes  Eyes: No recent vision changes, eye pain  ENT: No Congestion, Sore Throat  Endocrine: No Excess Sweating, Temperature Intolerance  Cardiovascular: +Chest Pain, Palpitations, SOB, Pre-syncope, Syncope  Respiratory: No Cough, Congestion, Wheezing  GI: No dysuria, hematuria  MS: No Joint Pain, Swelling  Neurologic: No Headaches, Imbalance, Focal Deficits  Skin: No Rashes, Hematoma, Prurpura    PHYSICAL EXAM:  Vital Signs Last 24 Hrs  T(C): 36.8 (30 Dec 2017 17:15), Max: 37.1 (30 Dec 2017 04:33)  T(F): 98.2 (30 Dec 2017 17:15), Max: 98.8 (30 Dec 2017 04:33)  HR: 57 (30 Dec 2017 17:15) (45 - 84)  BP: 102/58 (30 Dec 2017 17:15) (102/58 - 128/69)  BP(mean): --  RR: 18 (30 Dec 2017 17:15) (18 - 19)  SpO2: 100% (30 Dec 2017 17:15) (95% - 100%)      Appearance: Normal; NAD	  HEENT:   NCAT, EOMI	  Cardiovascular: Normal S1 S2, +Irregularly irregular, No JVD, No murmurs, No edema  Respiratory: Lungs clear to auscultation, no use of accessory muscles	  Psychiatry: A & O x 3, +Intermittent delirium  Gastrointestinal:  Soft, Non-tender	  Skin: No rashes, No ecchymoses, No cyanosis	  Neurologic: Non-focal, no speech abnormalities  Extremities: Normal range of motion, No clubbing, cyanosis or edema  Vascular: Peripheral pulses palpable 2+ bilaterally, no prominent varicosities      LABS:	  Labs Reviewed 	12-31    CBC Full  -  ( 30 Dec 2017 09:19 )  WBC Count : 9.0 K/uL  Hemoglobin : 13.4 g/dL  Hematocrit : 39.8 %  Platelet Count - Automated : 239 K/uL  Mean Cell Volume : 98.6 fl  Mean Cell Hemoglobin : 33.1 pg  Mean Cell Hemoglobin Concentration : 33.6 gm/dL  Auto Neutrophil # : x  Auto Lymphocyte # : x  Auto Monocyte # : x  Auto Eosinophil # : x  Auto Basophil # : x  Auto Neutrophil % : x  Auto Lymphocyte % : x  Auto Monocyte % : x  Auto Eosinophil % : x  Auto Basophil % : x    12-30    140  |  103  |  14  ----------------------------<  90  3.8   |  25  |  0.72  12-29    139  |  102  |  11  ----------------------------<  129<H>  3.7   |  25  |  0.61    Ca    8.5      30 Dec 2017 09:19  Ca    8.8      29 Dec 2017 05:24  Phos  2.6     12-29  Mg     2.1     12-29        TELEMETRY: 	    AF  	  =======================================================================================  =======================================================================================  Assessment:  82 M h.o. - Gout, Dementia/Psychosis, HTN, AF with RBBB not on AC - p/w NonSTEMI - patient decided for medical management - currently being managed on ASA/Plavix/Hep gtt/Atorva     Recommendations  - Continue ASA  - Continue Plavix  - Continue Atorvastatin 80  - Change heparin -> Eliquis (Given GOC, no endpoint with IV anticoagulation until switch since cath is not being pursued)  - Will follow.    Please call with questions.      Sarmad Hartley MD [facility]  [phone]

## 2017-12-31 NOTE — PROVIDER CONTACT NOTE (CRITICAL VALUE NOTIFICATION) - ASSESSMENT
Patient A&Ox2-3, VSS. aPTT 176.4. Patient is on heparin gtt at 16 ml/hr for ACS. No s/s of bleeding noted.

## 2017-12-31 NOTE — PROGRESS NOTE ADULT - SUBJECTIVE AND OBJECTIVE BOX
Patient is a 82y old  Male who presents with a chief complaint of chest pain (28 Dec 2017 23:25)      SUBJECTIVE / OVERNIGHT EVENTS:   Feels better.  Denies CP/SOB/Palpitation/HA.    MEDICATIONS  (STANDING):  allopurinol 300 milliGRAM(s) Oral daily  aspirin enteric coated 81 milliGRAM(s) Oral daily  atorvastatin 80 milliGRAM(s) Oral at bedtime  clopidogrel Tablet 75 milliGRAM(s) Oral daily  heparin  Infusion.  Unit(s)/Hr (10 mL/Hr) IV Continuous <Continuous>  risperiDONE   Tablet 0.5 milliGRAM(s) Oral at bedtime  valproic  acid Syrup 125 milliGRAM(s) Oral two times a day  valproic  acid Syrup 250 milliGRAM(s) Oral at bedtime    MEDICATIONS  (PRN):  heparin  Injectable 6000 Unit(s) IV Push every 6 hours PRN For aPTT less than 40  melatonin 3 milliGRAM(s) Oral at bedtime PRN Insomnia        CAPILLARY BLOOD GLUCOSE        I&O's Summary    30 Dec 2017 07:01  -  31 Dec 2017 07:00  --------------------------------------------------------  IN: 994 mL / OUT: 0 mL / NET: 994 mL    31 Dec 2017 07:01  -  31 Dec 2017 16:32  --------------------------------------------------------  IN: 600 mL / OUT: 0 mL / NET: 600 mL        PHYSICAL EXAM:  GENERAL: NAD, well-developed  HEAD:  Atraumatic, Normocephalic  NECK: Supple, No JVD  CHEST/LUNG: Clear to auscultation bilaterally; No wheezing.  HEART: Regular rate and rhythm; No murmurs, rubs, or gallops  ABDOMEN: Soft, Nontender, Nondistended; Bowel sounds present  EXTREMITIES:   No clubbing, cyanosis, or edema  NEUROLOGY: Awake.  SKIN: No rashes    LABS:                        13.8   9.9   )-----------( 239      ( 31 Dec 2017 08:53 )             40.3     12-31    141  |  105  |  11  ----------------------------<  91  4.0   |  25  |  0.66    Ca    8.8      31 Dec 2017 08:53      PTT - ( 31 Dec 2017 08:53 )  PTT:73.4 sec  CARDIAC MARKERS ( 30 Dec 2017 09:19 )  x     / 1.23 ng/mL / 184 U/L / x     / 11.3 ng/mL        CAPILLARY BLOOD GLUCOSE                    RADIOLOGY & ADDITIONAL TESTS:    Imaging Personally Reviewed:    Consultant(s) Notes Reviewed:      Care Discussed with Consultants/Other Providers:

## 2018-01-01 PROCEDURE — 99233 SBSQ HOSP IP/OBS HIGH 50: CPT

## 2018-01-01 RX ORDER — APIXABAN 2.5 MG/1
5 TABLET, FILM COATED ORAL EVERY 12 HOURS
Qty: 0 | Refills: 0 | Status: DISCONTINUED | OUTPATIENT
Start: 2018-01-01 | End: 2018-01-09

## 2018-01-01 RX ADMIN — CLOPIDOGREL BISULFATE 75 MILLIGRAM(S): 75 TABLET, FILM COATED ORAL at 10:07

## 2018-01-01 RX ADMIN — Medication 100 MILLIGRAM(S): at 18:24

## 2018-01-01 RX ADMIN — APIXABAN 5 MILLIGRAM(S): 2.5 TABLET, FILM COATED ORAL at 22:08

## 2018-01-01 RX ADMIN — APIXABAN 5 MILLIGRAM(S): 2.5 TABLET, FILM COATED ORAL at 10:07

## 2018-01-01 RX ADMIN — Medication 100 MILLIGRAM(S): at 05:33

## 2018-01-01 RX ADMIN — SENNA PLUS 2 TABLET(S): 8.6 TABLET ORAL at 22:08

## 2018-01-01 RX ADMIN — Medication 250 MILLIGRAM(S): at 22:08

## 2018-01-01 RX ADMIN — RISPERIDONE 0.5 MILLIGRAM(S): 4 TABLET ORAL at 22:08

## 2018-01-01 RX ADMIN — Medication 125 MILLIGRAM(S): at 10:06

## 2018-01-01 RX ADMIN — Medication 81 MILLIGRAM(S): at 10:07

## 2018-01-01 RX ADMIN — ATORVASTATIN CALCIUM 80 MILLIGRAM(S): 80 TABLET, FILM COATED ORAL at 22:08

## 2018-01-01 RX ADMIN — Medication 125 MILLIGRAM(S): at 18:24

## 2018-01-01 RX ADMIN — Medication 300 MILLIGRAM(S): at 10:07

## 2018-01-01 NOTE — PROVIDER CONTACT NOTE (OTHER) - ACTION/TREATMENT ORDERED:
PA made aware. PA to notify attending. Keep heparin drip infusing. Retry getting aPTT blood draw at 5 AM. Continue to monitor patient. PA made aware. PA to notify attending. Keep heparin drip infusing. Retry aPTT blood draw at 5 AM. Continue to monitor patient.   **5AM: Patient continues to refuse aPTT blood draw. c/w heparin gtt**

## 2018-01-01 NOTE — PROGRESS NOTE ADULT - SUBJECTIVE AND OBJECTIVE BOX
==============================================================  *****************CARDIOLOGY PROGRESS NOTE********************  ==============================================================    REFERRING PHYSICIAN:  Jomar Francois    REASON FOR CONSULT:  Chest Pain    CHIEF COMPLAINT:   Patient is a 82y old  Male who presents with a chief complaint of chest pain (28 Dec 2017 23:25)      BRIEF SUMMARY:  82 M h.o. - Gout, Dementia/Psychosis, HTN, AF with RBBB not on AC - p/w NonSTEMI - patient decided for medical management - currently being managed on ASA/Plavix/Hep gtt/Atorva   ================================================  24 HR EVENTS:  - BPs in normal range  - HRs in normal range ; although one pause   - Remains in AF; 4 sec pause overnight; not on BB; eyedrops with BB properties  ================================================  Allergies    No Known Allergies    Intolerances    	    MEDICATIONS:  MEDICATIONS  (STANDING):  allopurinol 300 milliGRAM(s) Oral daily  aspirin enteric coated 81 milliGRAM(s) Oral daily  atorvastatin 80 milliGRAM(s) Oral at bedtime  clopidogrel Tablet 75 milliGRAM(s) Oral daily  heparin  Infusion.  Unit(s)/Hr (10 mL/Hr) IV Continuous <Continuous>  risperiDONE   Tablet 0.5 milliGRAM(s) Oral at bedtime  valproic  acid Syrup 125 milliGRAM(s) Oral two times a day  valproic  acid Syrup 250 milliGRAM(s) Oral at bedtime        PAST MEDICAL & SURGICAL HISTORY:  Dementia  OA (osteoarthritis)  Obesity  Gout  Afib  H/O right knee surgery      FAMILY HISTORY:  Family history of rectal cancer (Child)    NC - Mother/Father    REVIEW OF SYSTEMS: (Unless + Sign Before Symptom, It is Negative)  Constitutional: No Fever, Fatigue, Weight Changes  Eyes: No recent vision changes, eye pain  ENT: No Congestion, Sore Throat  Endocrine: No Excess Sweating, Temperature Intolerance  Cardiovascular: +Chest Pain, Palpitations, SOB, Pre-syncope, Syncope  Respiratory: No Cough, Congestion, Wheezing  GI: No dysuria, hematuria  MS: No Joint Pain, Swelling  Neurologic: No Headaches, Imbalance, Focal Deficits  Skin: No Rashes, Hematoma, Prurpura    PHYSICAL EXAM:  Vital Signs Last 24 Hrs  T(C): 36.8 (30 Dec 2017 17:15), Max: 37.1 (30 Dec 2017 04:33)  T(F): 98.2 (30 Dec 2017 17:15), Max: 98.8 (30 Dec 2017 04:33)  HR: 57 (30 Dec 2017 17:15) (45 - 84)  BP: 102/58 (30 Dec 2017 17:15) (102/58 - 128/69)  BP(mean): --  RR: 18 (30 Dec 2017 17:15) (18 - 19)  SpO2: 100% (30 Dec 2017 17:15) (95% - 100%)      Appearance: Normal; NAD	  HEENT:   NCAT, EOMI	  Cardiovascular: Normal S1 S2, +Irregularly irregular, No JVD, No murmurs, No edema  Respiratory: Lungs clear to auscultation, no use of accessory muscles	  Psychiatry: A & O x 3, +Intermittent delirium  Gastrointestinal:  Soft, Non-tender	  Skin: No rashes, No ecchymoses, No cyanosis	  Neurologic: Non-focal, no speech abnormalities  Extremities: Normal range of motion, No clubbing, cyanosis or edema  Vascular: Peripheral pulses palpable 2+ bilaterally, no prominent varicosities      LABS:	  Labs Reviewed 	01-01-18    CBC Full  -  ( 30 Dec 2017 09:19 )  WBC Count : 9.0 K/uL  Hemoglobin : 13.4 g/dL  Hematocrit : 39.8 %  Platelet Count - Automated : 239 K/uL  Mean Cell Volume : 98.6 fl  Mean Cell Hemoglobin : 33.1 pg  Mean Cell Hemoglobin Concentration : 33.6 gm/dL  Auto Neutrophil # : x  Auto Lymphocyte # : x  Auto Monocyte # : x  Auto Eosinophil # : x  Auto Basophil # : x  Auto Neutrophil % : x  Auto Lymphocyte % : x  Auto Monocyte % : x  Auto Eosinophil % : x  Auto Basophil % : x    12-30    140  |  103  |  14  ----------------------------<  90  3.8   |  25  |  0.72  12-29    139  |  102  |  11  ----------------------------<  129<H>  3.7   |  25  |  0.61    Ca    8.5      30 Dec 2017 09:19  Ca    8.8      29 Dec 2017 05:24  Phos  2.6     12-29  Mg     2.1     12-29        TELEMETRY: 	    AF  	  =======================================================================================  =======================================================================================  Assessment:  82 M h.o. 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      Sarmad Hartley MD [facility]

## 2018-01-01 NOTE — CHART NOTE - NSCHARTNOTEFT_GEN_A_CORE
Called by RN overnight that patient refusing to have aPTT drawn. Pt on  heparin drip at 15 ml/hr for ACS.  Previous aPTT from 1645 was 46.3.  - Patient educated on importance of having aPTT drawn.   - Monitor s/s of bleeding.   - Monitor H/H.   - Will try again for APTT.   - Dr. Francois called Heparin gtt d/hermann.   - Started on Eliquis 5mg BID. Will receive 1st dose at 0900am.  - Will endorse to primary team in amObey BRADY. ADIN MCDANIELS   #[id number]  Medicine PA.

## 2018-01-01 NOTE — PROVIDER CONTACT NOTE (OTHER) - ASSESSMENT
Pt A+Ox3-4. No c/o pain, discomfort, or distress; asymptomatic. Pt started on Eliquis this AM. DRN/DNI. R2 pads on pt. VSS

## 2018-01-01 NOTE — PROVIDER CONTACT NOTE (OTHER) - ASSESSMENT
Patient A&Ox3, confused at times. Patient refusing to have aPTT drawn. Patient is on heparin drip at 15 ml/hr for ACS. Patient educated on importance of having aPTT drawn. Patient states "I don't care." Multiple attempts made to draw blood. Patient continues to refuse. No s/s of bleeding noted. Previous aPTT from 1645 was 46.3.

## 2018-01-01 NOTE — PROVIDER CONTACT NOTE (OTHER) - ASSESSMENT
Patient A&Ox3 at this time. Patient is confused at times. Patient continues to refuse aPTT blood draw. Patient is on heparin drip at 15 ml/hr for ACS. Patient educated on importance of aPTT blood draw in relation to heparin drip. Patient refusing to give reason as to why he does not want blood drawn. No s/s of bleeding noted. Patient denies chest pain, lightheadedness, or shortness of breath.

## 2018-01-01 NOTE — CHART NOTE - NSCHARTNOTEFT_GEN_A_CORE
Pt seen and examined for 4 sec pause on Tele , asymptomatic , R2 pads on Pt , d/w DR Pfeiffer and DR nguyen, observe off [employer] . Pt wish to be DNR/DNI palliative care consult to be called in AM to discuss discharge disposition and ? Hospice eval . Consider d/c Tele .         Raheel Ghosh NP-C [id number]

## 2018-01-01 NOTE — PROVIDER CONTACT NOTE (OTHER) - ACTION/TREATMENT ORDERED:
PA made aware. PA to assess and consult with patient. Patient continues to refuse. Ordered to keep heparin gtt running. Retry blood draw 4 hours after aPTT due time. Continue to monitor

## 2018-01-01 NOTE — PROGRESS NOTE ADULT - SUBJECTIVE AND OBJECTIVE BOX
Patient is a 82y old  Male who presents with a chief complaint of chest pain (28 Dec 2017 23:25)      SUBJECTIVE / OVERNIGHT EVENTS:   Feels better.  Denies CP/SOB/Palpitation/HA.    MEDICATIONS  (STANDING):  allopurinol 300 milliGRAM(s) Oral daily  apixaban 5 milliGRAM(s) Oral every 12 hours  aspirin enteric coated 81 milliGRAM(s) Oral daily  atorvastatin 80 milliGRAM(s) Oral at bedtime  clopidogrel Tablet 75 milliGRAM(s) Oral daily  docusate sodium 100 milliGRAM(s) Oral two times a day  risperiDONE   Tablet 0.5 milliGRAM(s) Oral at bedtime  senna 2 Tablet(s) Oral at bedtime  valproic  acid Syrup 125 milliGRAM(s) Oral two times a day  valproic  acid Syrup 250 milliGRAM(s) Oral at bedtime    MEDICATIONS  (PRN):  melatonin 3 milliGRAM(s) Oral at bedtime PRN Insomnia        CAPILLARY BLOOD GLUCOSE        I&O's Summary    31 Dec 2017 07:01  -  01 Jan 2018 07:00  --------------------------------------------------------  IN: 1065 mL / OUT: 0 mL / NET: 1065 mL    01 Jan 2018 07:01  -  02 Jan 2018 00:23  --------------------------------------------------------  IN: 660 mL / OUT: 0 mL / NET: 660 mL        PHYSICAL EXAM:  GENERAL: NAD, well-developed  HEAD:  Atraumatic, Normocephalic  NECK: Supple, No JVD  CHEST/LUNG: Clear to auscultation bilaterally; No wheezing.  HEART: Regular rate and rhythm; No murmurs, rubs, or gallops  ABDOMEN: Soft, Nontender, Nondistended; Bowel sounds present  EXTREMITIES:   No clubbing, cyanosis, or edema  NEUROLOGY: AAO X 3  SKIN: No rashes    LABS:                        13.8   9.9   )-----------( 239      ( 31 Dec 2017 08:53 )             40.3     12-31    141  |  105  |  11  ----------------------------<  91  4.0   |  25  |  0.66    Ca    8.8      31 Dec 2017 08:53      PTT - ( 31 Dec 2017 16:45 )  PTT:46.3 sec        CAPILLARY BLOOD GLUCOSE                    RADIOLOGY & ADDITIONAL TESTS:    Imaging Personally Reviewed:    Consultant(s) Notes Reviewed:      Care Discussed with Consultants/Other Providers:

## 2018-01-02 PROCEDURE — 99232 SBSQ HOSP IP/OBS MODERATE 35: CPT

## 2018-01-02 RX ADMIN — Medication 3 MILLIGRAM(S): at 21:55

## 2018-01-02 RX ADMIN — Medication 250 MILLIGRAM(S): at 21:56

## 2018-01-02 RX ADMIN — Medication 125 MILLIGRAM(S): at 17:51

## 2018-01-02 RX ADMIN — Medication 125 MILLIGRAM(S): at 10:34

## 2018-01-02 RX ADMIN — Medication 300 MILLIGRAM(S): at 10:34

## 2018-01-02 RX ADMIN — APIXABAN 5 MILLIGRAM(S): 2.5 TABLET, FILM COATED ORAL at 21:56

## 2018-01-02 RX ADMIN — Medication 100 MILLIGRAM(S): at 06:28

## 2018-01-02 RX ADMIN — CLOPIDOGREL BISULFATE 75 MILLIGRAM(S): 75 TABLET, FILM COATED ORAL at 10:34

## 2018-01-02 RX ADMIN — Medication 100 MILLIGRAM(S): at 17:51

## 2018-01-02 RX ADMIN — Medication 81 MILLIGRAM(S): at 10:34

## 2018-01-02 RX ADMIN — SENNA PLUS 2 TABLET(S): 8.6 TABLET ORAL at 21:55

## 2018-01-02 RX ADMIN — ATORVASTATIN CALCIUM 80 MILLIGRAM(S): 80 TABLET, FILM COATED ORAL at 21:55

## 2018-01-02 RX ADMIN — APIXABAN 5 MILLIGRAM(S): 2.5 TABLET, FILM COATED ORAL at 10:34

## 2018-01-02 RX ADMIN — RISPERIDONE 0.5 MILLIGRAM(S): 4 TABLET ORAL at 21:56

## 2018-01-02 NOTE — PROGRESS NOTE ADULT - SUBJECTIVE AND OBJECTIVE BOX
==============================================================  *****************CARDIOLOGY PROGRESS NOTE********************  ==============================================================    REFERRING PHYSICIAN:  Jomar Francois    REASON FOR CONSULT:  Chest Pain    CHIEF COMPLAINT:   Patient is a 82y old  Male who presents with a chief complaint of chest pain (28 Dec 2017 23:25)      BRIEF SUMMARY:  82 M h.o. - Gout, Dementia/Psychosis, HTN, AF with RBBB not on AC - p/w NonSTEMI - patient and his family decided for medical management and have made him DNR/DNI- currently being managed medically on ASA/Plavix/AC/Atorva   ================================================  24 HR EVENTS:  - BPs in normal range  - HRs in normal range ; one 3.2 sec pause yesterday  - Remains in AF;  not on BB; eyedrops with BB properties  ================================================  Allergies    No Known Allergies    Intolerances    	    MEDICATIONS:  MEDICATIONS  (STANDING):  allopurinol 300 milliGRAM(s) Oral daily  apixaban 5 milliGRAM(s) Oral every 12 hours  aspirin enteric coated 81 milliGRAM(s) Oral daily  atorvastatin 80 milliGRAM(s) Oral at bedtime  clopidogrel Tablet 75 milliGRAM(s) Oral daily  docusate sodium 100 milliGRAM(s) Oral two times a day  risperiDONE   Tablet 0.5 milliGRAM(s) Oral at bedtime  senna 2 Tablet(s) Oral at bedtime  valproic  acid Syrup 125 milliGRAM(s) Oral two times a day  valproic  acid Syrup 250 milliGRAM(s) Oral at bedtime    MEDICATIONS  (PRN):  melatonin 3 milliGRAM(s) Oral at bedtime PRN Insomnia      PAST MEDICAL & SURGICAL HISTORY:  Dementia  OA (osteoarthritis)  Obesity  Gout  Afib  H/O right knee surgery      FAMILY HISTORY:  Family history of rectal cancer (Child)    NC - Mother/Father    REVIEW OF SYSTEMS: (Unless + Sign Before Symptom, It is Negative)  Constitutional: No Fever, Fatigue, Weight Changes  Eyes: No recent vision changes, eye pain  ENT: No Congestion, Sore Throat  Endocrine: No Excess Sweating, Temperature Intolerance  Cardiovascular: +Chest Pain, Palpitations, SOB, Pre-syncope, Syncope  Respiratory: No Cough, Congestion, Wheezing  GI: No dysuria, hematuria  MS: No Joint Pain, Swelling  Neurologic: No Headaches, Imbalance, Focal Deficits  Skin: No Rashes, Hematoma, Prurpura    PHYSICAL EXAM:  Vital Signs Last 24 Hrs  T(C): 36.7 (02 Jan 2018 12:12), Max: 36.7 (01 Jan 2018 21:45)  T(F): 98 (02 Jan 2018 12:12), Max: 98.1 (01 Jan 2018 21:45)  HR: 53 (02 Jan 2018 12:12) (51 - 53)  BP: 122/53 (02 Jan 2018 12:12) (120/72 - 128/74)  BP(mean): --  RR: 18 (02 Jan 2018 12:12) (18 - 18)  SpO2: 96% (02 Jan 2018 12:12) (96% - 97%)    Appearance: Normal; NAD	  HEENT:   NCAT, EOMI	  Cardiovascular: Normal S1 S2, +Irregularly irregular, No JVD, No murmurs, No edema  Respiratory: Lungs clear to auscultation, no use of accessory muscles	  Psychiatry: A & O x 3, +Intermittent delirium  Gastrointestinal:  Soft, Non-tender	  Skin: No rashes, No ecchymoses, No cyanosis	  Neurologic: Non-focal, no speech abnormalities  Extremities: Normal range of motion, No clubbing, cyanosis or edema  Vascular: Peripheral pulses palpable 2+ bilaterally, no prominent varicosities      LABS:	reviewed, no new labs        TELEMETRY: 	    AF 40-60 bpm, low 36 BPm while sleeping, one 3.2 sec pause yesterday, no pauses overnight  	  =======================================================================================  =======================================================================================  Assessment:  82 M h.o. - Gout, Dementia/Psychosis, HTN, AF with RBBB not on AC - p/w NonSTEMI - patient decided for medical management - currently being managed on ASA/Plavix/AC /atorva     Recommendations  - Continue ASA  - Continue Plavix  - Continue Atorvastatin 80  -continue eliquis  - Monitor Tele for pauses; although AF <5sec pause is not a reason for PPM; particularly given GOC and DNR/DNI status- if no further pauses in 24 hrs consider discontinuation of telemetry  -please arrange outpatient cardiology followup for 2-3 weeks following discharge.    Cardiology will follow      Vick Nicole MD, PhD  Cardiology Attending  [phone]

## 2018-01-02 NOTE — PROGRESS NOTE ADULT - SUBJECTIVE AND OBJECTIVE BOX
Patient is a 82y old  Male who presents with a chief complaint of chest pain (28 Dec 2017 23:25)      SUBJECTIVE / OVERNIGHT EVENTS:   Feels better.  Denies CP/SOB/Palpitation/HA.    MEDICATIONS  (STANDING):  allopurinol 300 milliGRAM(s) Oral daily  apixaban 5 milliGRAM(s) Oral every 12 hours  aspirin enteric coated 81 milliGRAM(s) Oral daily  atorvastatin 80 milliGRAM(s) Oral at bedtime  clopidogrel Tablet 75 milliGRAM(s) Oral daily  docusate sodium 100 milliGRAM(s) Oral two times a day  risperiDONE   Tablet 0.5 milliGRAM(s) Oral at bedtime  senna 2 Tablet(s) Oral at bedtime  valproic  acid Syrup 125 milliGRAM(s) Oral two times a day  valproic  acid Syrup 250 milliGRAM(s) Oral at bedtime    MEDICATIONS  (PRN):  melatonin 3 milliGRAM(s) Oral at bedtime PRN Insomnia        CAPILLARY BLOOD GLUCOSE        I&O's Summary    01 Jan 2018 07:01  -  02 Jan 2018 07:00  --------------------------------------------------------  IN: 920 mL / OUT: 0 mL / NET: 920 mL    02 Jan 2018 07:01  -  02 Jan 2018 14:36  --------------------------------------------------------  IN: 540 mL / OUT: 0 mL / NET: 540 mL        PHYSICAL EXAM:  GENERAL: NAD, well-developed  HEAD:  Atraumatic, Normocephalic  NECK: Supple, No JVD  CHEST/LUNG: Clear to auscultation bilaterally; No wheezing.  HEART: Regular rate and rhythm; No murmurs, rubs, or gallops  ABDOMEN: Soft, Nontender, Nondistended; Bowel sounds present  EXTREMITIES:   No clubbing, cyanosis, or edema  NEUROLOGY: AAO X 3  SKIN: No rashes    LABS:          PTT - ( 31 Dec 2017 16:45 )  PTT:46.3 sec        CAPILLARY BLOOD GLUCOSE                    RADIOLOGY & ADDITIONAL TESTS:    Imaging Personally Reviewed:    Consultant(s) Notes Reviewed:      Care Discussed with Consultants/Other Providers:

## 2018-01-03 DIAGNOSIS — Z51.5 ENCOUNTER FOR PALLIATIVE CARE: ICD-10-CM

## 2018-01-03 LAB
ANION GAP SERPL CALC-SCNC: 10 MMOL/L — SIGNIFICANT CHANGE UP (ref 5–17)
BUN SERPL-MCNC: 12 MG/DL — SIGNIFICANT CHANGE UP (ref 7–23)
CALCIUM SERPL-MCNC: 8.5 MG/DL — SIGNIFICANT CHANGE UP (ref 8.4–10.5)
CHLORIDE SERPL-SCNC: 105 MMOL/L — SIGNIFICANT CHANGE UP (ref 96–108)
CO2 SERPL-SCNC: 23 MMOL/L — SIGNIFICANT CHANGE UP (ref 22–31)
CREAT SERPL-MCNC: 0.66 MG/DL — SIGNIFICANT CHANGE UP (ref 0.5–1.3)
GLUCOSE SERPL-MCNC: 97 MG/DL — SIGNIFICANT CHANGE UP (ref 70–99)
MAGNESIUM SERPL-MCNC: 1.9 MG/DL — SIGNIFICANT CHANGE UP (ref 1.6–2.6)
PHOSPHATE SERPL-MCNC: 3.4 MG/DL — SIGNIFICANT CHANGE UP (ref 2.5–4.5)
POTASSIUM SERPL-MCNC: 4.1 MMOL/L — SIGNIFICANT CHANGE UP (ref 3.5–5.3)
POTASSIUM SERPL-SCNC: 4.1 MMOL/L — SIGNIFICANT CHANGE UP (ref 3.5–5.3)
SODIUM SERPL-SCNC: 138 MMOL/L — SIGNIFICANT CHANGE UP (ref 135–145)

## 2018-01-03 PROCEDURE — 99223 1ST HOSP IP/OBS HIGH 75: CPT | Mod: GC

## 2018-01-03 PROCEDURE — 99233 SBSQ HOSP IP/OBS HIGH 50: CPT

## 2018-01-03 PROCEDURE — 99497 ADVNCD CARE PLAN 30 MIN: CPT | Mod: 25

## 2018-01-03 PROCEDURE — 93010 ELECTROCARDIOGRAM REPORT: CPT

## 2018-01-03 RX ORDER — MAGNESIUM SULFATE 500 MG/ML
1 VIAL (ML) INJECTION ONCE
Qty: 0 | Refills: 0 | Status: COMPLETED | OUTPATIENT
Start: 2018-01-03 | End: 2018-01-03

## 2018-01-03 RX ADMIN — Medication 125 MILLIGRAM(S): at 06:18

## 2018-01-03 RX ADMIN — CLOPIDOGREL BISULFATE 75 MILLIGRAM(S): 75 TABLET, FILM COATED ORAL at 11:50

## 2018-01-03 RX ADMIN — APIXABAN 5 MILLIGRAM(S): 2.5 TABLET, FILM COATED ORAL at 10:09

## 2018-01-03 RX ADMIN — Medication 300 MILLIGRAM(S): at 11:50

## 2018-01-03 RX ADMIN — Medication 100 MILLIGRAM(S): at 06:18

## 2018-01-03 RX ADMIN — ATORVASTATIN CALCIUM 80 MILLIGRAM(S): 80 TABLET, FILM COATED ORAL at 21:38

## 2018-01-03 RX ADMIN — Medication 100 GRAM(S): at 15:34

## 2018-01-03 RX ADMIN — Medication 81 MILLIGRAM(S): at 11:50

## 2018-01-03 RX ADMIN — Medication 125 MILLIGRAM(S): at 17:23

## 2018-01-03 RX ADMIN — RISPERIDONE 0.5 MILLIGRAM(S): 4 TABLET ORAL at 21:38

## 2018-01-03 RX ADMIN — Medication 250 MILLIGRAM(S): at 21:38

## 2018-01-03 RX ADMIN — APIXABAN 5 MILLIGRAM(S): 2.5 TABLET, FILM COATED ORAL at 20:09

## 2018-01-03 RX ADMIN — SENNA PLUS 2 TABLET(S): 8.6 TABLET ORAL at 21:38

## 2018-01-03 NOTE — CONSULT NOTE ADULT - PROBLEM SELECTOR RECOMMENDATION 4
Spoke with Baltazar Mcelroy Jr who is the son and HCP.  He has had discussions with his father over the years and knows that he would not want "heroic" measures given his dementia and dependence on NH care.  We discussed the option of full comfort care and returning to NH with hospice services if possible.  Will place referral with [facility] to see if they go to [facility], otherwise,  should look into what agency works with this nursing home.  Son to complete a MOLST and send to me which I will sign and place on the chart.  Of note- the son is dealing with the death of his own son this week, wake on Friday.  He  asks that his father not be told of this, but I felt it was important for the medical team to be aware of this circumstance.  The son understands that his father could die at anytime and wants him to be comfortable.

## 2018-01-03 NOTE — PROGRESS NOTE ADULT - SUBJECTIVE AND OBJECTIVE BOX
Patient is a 82y old  Male who presents with a chief complaint of chest pain (28 Dec 2017 23:25)      SUBJECTIVE / OVERNIGHT EVENTS:   Feels better.  Denies CP/SOB/Palpitation/HA.    MEDICATIONS  (STANDING):  allopurinol 300 milliGRAM(s) Oral daily  apixaban 5 milliGRAM(s) Oral every 12 hours  aspirin enteric coated 81 milliGRAM(s) Oral daily  atorvastatin 80 milliGRAM(s) Oral at bedtime  clopidogrel Tablet 75 milliGRAM(s) Oral daily  docusate sodium 100 milliGRAM(s) Oral two times a day  risperiDONE   Tablet 0.5 milliGRAM(s) Oral at bedtime  senna 2 Tablet(s) Oral at bedtime  valproic  acid Syrup 125 milliGRAM(s) Oral two times a day  valproic  acid Syrup 250 milliGRAM(s) Oral at bedtime    MEDICATIONS  (PRN):  melatonin 3 milliGRAM(s) Oral at bedtime PRN Insomnia        CAPILLARY BLOOD GLUCOSE        I&O's Summary    02 Jan 2018 07:01  -  03 Jan 2018 07:00  --------------------------------------------------------  IN: 1315 mL / OUT: 1 mL / NET: 1314 mL    03 Jan 2018 07:01  -  04 Jan 2018 00:38  --------------------------------------------------------  IN: 1140 mL / OUT: 0 mL / NET: 1140 mL        PHYSICAL EXAM:  GENERAL: NAD, well-developed  HEAD:  Atraumatic, Normocephalic  NECK: Supple, No JVD  CHEST/LUNG: Clear to auscultation bilaterally; No wheezing.  HEART: Regular rate and rhythm; No murmurs, rubs, or gallops  ABDOMEN: Soft, Nontender, Nondistended; Bowel sounds present  EXTREMITIES:   No clubbing, cyanosis, or edema  NEUROLOGY: AAO X 3  SKIN: No rashes    LABS:    01-03    138  |  105  |  12  ----------------------------<  97  4.1   |  23  |  0.66    Ca    8.5      03 Jan 2018 07:01  Phos  3.4     01-03  Mg     1.9     01-03              CAPILLARY BLOOD GLUCOSE                    RADIOLOGY & ADDITIONAL TESTS:    Imaging Personally Reviewed:    Consultant(s) Notes Reviewed:      Care Discussed with Consultants/Other Providers:

## 2018-01-03 NOTE — CHART NOTE - NSCHARTNOTEFT_GEN_A_CORE
Notified by RN at 4:25 in regards to 3 pauses on telemetry starting at 3:43 on the monitor ( 3.25 seconds, 2.71 seconds, 3.17 seconds). Patient seen and evaluated at the bedside. Patient denies chest pain, shortness of breath, dizziness, and lightheadedness.     Vital Signs Last 24 Hrs  T(C): 36.5 (03 Jan 2018 04:41), Max: 36.7 (02 Jan 2018 12:12)  T(F): 97.7 (03 Jan 2018 04:41), Max: 98 (02 Jan 2018 12:12)  HR: 49 (03 Jan 2018 04:41) (49 - 68)  BP: 111/61 (03 Jan 2018 04:41) (111/61 - 131/77)  BP(mean): --  RR: 18 (03 Jan 2018 04:41) (18 - 18)  SpO2: 97% (03 Jan 2018 04:41) (93% - 97%)    Physical Exam   General: NAD, confused at baseline, answers questions  Cardiac: bradycardia, irregular  Pulmonary: CTAB   Abdomen: soft, non-tender, non-distended, bowel sounds present     HPI:  83 yo man with PMH of Afib on Eliquis, HTN, reported CHF?, gout, dementia with reported psychosis presenting from nursing home after an episode of chest pain since this am. Per son, patient was eating breakfast and had episodes of nonbloody, nonbilious emesis of undigested food. Patient was then complaining of left sided chest discomfort on the side: described as a dull pressure. No reported SOB, palpitation. Patient was reported to be given Nitroglycerin tabs x2 without relief and transferred to [facility] for further evaluation. Of note patient mostly bedbound/wheelchair bound and has been at nursing home facility the since 2016. (28 Dec 2017 23:25). Patient diagnosed with NSTEMI, currently receiving medical management. Patient with prior telemetry pauses and now with additional 3 pauses.     Pauses on telemetry x 3 all less than 5 seconds in the setting of atrial fibrillation  - Continue to monitor on telemetry  - EKG- atrial fibrillation, bradycardia, no ST wave changes   - BMP, Mg, Phos   - Avoid AV pablo blockers   - R2 pads to be placed on chest   - Call placed to cardiology, Dr. Rose. Agreed with above plan. Continue with current management as per cardiology. Will relay to Cardiology attending to follow up in the morning.   - Will continue to monitor overnight and endorse to primary team in the morning.     Tyra Mcdaniels PA-C   [id number]

## 2018-01-03 NOTE — CONSULT NOTE ADULT - ASSESSMENT
82 year old nhr from [facility], with dementia, psychosis and now chest pain, + NSTEMI, course notable for pauses in rhythm and NS VT.  We are asked to follow up with family re: LUIS.

## 2018-01-03 NOTE — PROVIDER CONTACT NOTE (OTHER) - SITUATION
Pt had a 3.3 sec pause with one beat and a 2.72 sec pause with a HR of 41. Pt later had a 3.21 sec pause with a HR of 46.

## 2018-01-03 NOTE — CONSULT NOTE ADULT - ATTENDING COMMENTS
Patient seen and examined by me. Briefly, 82 Male with AFIB not on OAC, HTN, dementia, psychosis, now p/w persistent chest pain x 3 days, dull, pressure like, non-radiating, 8/10, not releived by change in position. In the ED EKG with AFIB, no significant ST or TW changes, Cardiac enzymes uptrending (Trop 0.03 -> 0.10, CKMB 28.1) concerning for NSTEMI.  -Agree with plan as above to treat for ACS, NSTEMI Type 1  -Cont Heparin GTT  -, followed by ASA 81 mg po QD  -Load with Plavix 300 mg po x 1, then Plavix 75 mg po QD  -Start Atorvastatin 80 mg po QHS  -Start Metoprolol 12.5 mg po BID  -Trend Cardiac enzymes, serial EKG if worsening chest pain  -Please keep NPO after midnight for likely LHC tomorrow. Will need to discuss with patients HCP (Son/Daughter)    Tushar Chen MD  Cardiology Attending  [facility]   Cell: [phone]  (Cardiology Nocturnist cell number available 7 pm - 7 am every night; available daytime week days for follow-up only; daytime weekends covered by general cardiology consult service)
I have personally seen and examined the patient and completed the note.    20    minutes for advanced care planning discussion separate and in addition to the e and m service provided.

## 2018-01-03 NOTE — PROGRESS NOTE ADULT - PROBLEM SELECTOR PROBLEM 6
Advanced care planning/counseling discussion

## 2018-01-03 NOTE — PROGRESS NOTE ADULT - SUBJECTIVE AND OBJECTIVE BOX
Cardiology Attending Progress Note    CHIEF COMPLAINT/REASON FOR CONSULT:    HISTORY OF PRESENT ILLNESS:     82 Male with AFIB on Apixaban, HTN, dementia, psychosis, now p/w persistent chest pain x 3 days, dull, pressure like, non-radiating, 8/10, not releived by change in position. In the ED EKG with AFIB, no significant ST or TW changes, Cardiac enzymes c/w NSTEMI, now downtrended, intermittent pauses noted on telemetry (Asymptomatic, <4 seconds)    INTERVAL EVENTS:   -s/p NSTEMI, completed course of Heparin GTT and transitioned to Apixaban  -Intermittent pauses on telemetry (<4 seconds) overnight  Patient seen and examined. Overall he states that he fells well. No Chest Pain. No SOB. No HA/Dizziness. No Palpitations. No N/V/D/F/C.    Allergies    No Known Allergies    Intolerances    	    MEDICATIONS:  apixaban 5 milliGRAM(s) Oral every 12 hours  aspirin enteric coated 81 milliGRAM(s) Oral daily  clopidogrel Tablet 75 milliGRAM(s) Oral daily        melatonin 3 milliGRAM(s) Oral at bedtime PRN  risperiDONE   Tablet 0.5 milliGRAM(s) Oral at bedtime  valproic  acid Syrup 125 milliGRAM(s) Oral two times a day  valproic  acid Syrup 250 milliGRAM(s) Oral at bedtime    docusate sodium 100 milliGRAM(s) Oral two times a day  senna 2 Tablet(s) Oral at bedtime    allopurinol 300 milliGRAM(s) Oral daily  atorvastatin 80 milliGRAM(s) Oral at bedtime        PAST MEDICAL & SURGICAL HISTORY:  Dementia  OA (osteoarthritis)  Obesity  Gout  Afib  H/O right knee surgery      FAMILY HISTORY:  Family history of rectal cancer (Child)      SOCIAL HISTORY:    remote former smoker, no ETOH, no IVDU    REVIEW OF SYSTEMS:    CONSTITUTIONAL: No weakness, fevers or chills  EYES/ENT: No visual changes;  No vertigo or throat pain   NECK: No pain or stiffness  RESPIRATORY: No cough, wheezing, hemoptysis; No shortness of breath  CARDIOVASCULAR: No chest pain or palpitations  GASTROINTESTINAL: No abdominal or epigastric pain. No nausea, vomiting, or hematemesis; No diarrhea or constipation. No melena or hematochezia.  GENITOURINARY: No dysuria, frequency or hematuria  NEUROLOGICAL: No numbness or weakness  SKIN: No itching, burning, rashes, or lesions   All other review of systems is negative unless indicated above.    PHYSICAL EXAM:  T(C): 36.5 (18 @ 04:41), Max: 36.7 (18 @ 21:04)  HR: 49 (18 @ 04:41) (49 - 68)  BP: 111/61 (18 @ 04:41) (111/61 - 131/77)  RR: 18 (18 @ 04:41) (18 - 18)  SpO2: 97% (18 @ 04:41) (93% - 97%)  Wt(kg): --  I&O's Summary    2018 07:01  -  2018 07:00  --------------------------------------------------------  IN: 1315 mL / OUT: 1 mL / NET: 1314 mL        Appearance: Normal	  HEENT:   Normal oral mucosa, PERRL, EOMI	  Lymphatic: No lymphadenopathy  Cardiovascular: Normal S1 S2, No JVD, No murmurs, No edema, 1/6 ARLEN  Respiratory: Lungs clear to auscultation	  Psychiatry: A & O x 3, Mood & affect appropriate  Gastrointestinal:  Soft, Non-tender, + BS	  Skin: No rashes, No ecchymoses, No cyanosis	  Neurologic: Non-focal  Extremities: Normal range of motion, No clubbing, cyanosis or edema  Vascular: Peripheral pulses palpable 2+ bilaterally    LABS:	 	          138  |  105  |  12  ----------------------------<  97  4.1   |  23  |  0.66    Ca    8.5      2018 07:01  Phos  3.4       Mg     1.9         EC/29: AFIB, RBBB, PRWP    TELEMETRY: AFIB, rare pauses (longest 3.3 seconds)  	    	  TTE: 2017:  EF (Gladystz): 63 %  ------------------------------------------------------------------------  Observations:  Mitral Valve: Mitral annular calcification, otherwise  normal mitral valve. Mild mitral regurgitation.  Aortic Valve/Aorta: Calcified trileaflet aortic valve with  normal opening. estimated aortic valve area equals 3.4 sqcm  (by planimetry), consistent with normal aortic stenosis.  Aortic Root: 3.7 cm.  Left Atrium: Normal left atrium.  Left Ventricle: Normal left ventricular systolic function  Increased relative wall thickness with normal left  ventricular mass index, consistent with concentric left  ventricular remodeling.  Right Heart: Normal right atrium. Right ventricular  enlargement with preserved   right ventricular systolic  function. Normal tricuspid valve. Moderate tricuspid  regurgitation. Normal pulmonic valve. Minimal pulmonic  regurgitation.  Pericardium/Pleura: Normal pericardium with no pericardial  effusion.  Hemodynamic: Estimated right atrial pressure is 8 mm Hg.  Estimated right ventricular systolic pressure equals 44 mm  Hg, assuming right atrial pressure equals 8 mm Hg,  consistent with mild pulmonary hypertension.  ------------------------------------------------------------------------  Conclusions:  1. Mitral annular calcification, otherwise normal mitral  valve.  2. Calcified trileaflet aortic valve with normal opening.  estimated aortic valve area equals 3.4 sqcm (by  planimetry), consistent with normal aortic stenosis.  3. Increased relative wall thickness with normal left  ventricular mass index, consistent with concentric left  ventricular remodeling.  4. Normal left ventricular systolic function  5. Right ventricular enlargement with preserved   right  ventricular systolic function.  6. Normal tricuspid valve. Moderate tricuspid  regurgitation.  7. Estimated pulmonary artery systolic pressure equals 44  mm Hg, assuming right atrial pressure equals 8 mm Hg,  consistent with mild pulmonary pressures.    A/P:  82 Male with AFIB on Apixaban, HTN, dementia, psychosis, now p/w persistent chest pain x 3 days, dull, pressure like, non-radiating, 8/10, not releived by change in position. In the ED EKG with AFIB, no significant ST or TW changes, Cardiac enzymes c/w NSTEMI, now downtrended, intermittent pauses noted on telemetry (Asymptomatic, <4 seconds)    1. s/p NSTEMI - Now s/p Type I NSTEMI (peak Trop 1.52, peak CKMB 127.4, now downtrended -> Trop 1.23, CKMB 11.3). Patient was medically managed in accordance with family wishes. Patient mostly wheelchair/bedbound.  -s/p >72 hours on Heparin GTT, now transitioned to Apixaban for AFIB  -Cont ASA 81 mg po QD lifelong  -Cont Plavix 75 mg po x 30 days, then discontinue to avoid long term triple therapy (ASA/Plavix/Apixaban)  -Cont Atorvastatin 80 mg Po QHS  -TTE done on 2017 with preserved LV/RV function    2. AFIB - ([id number] = 4, Age>75, HTN, Vasc=CAD/NSTEMI)  -Would decrease Apixaban to 2.5 mg po Q12  given the patients Age>80  -Asymptomatic bradycardia with intermittent pauses in the setting of AFIB (<5 seconds) noted on telemetry.  - No indication for PPM at this time. Further, does not appear to be consistent with patient/family wishes.    3. HTN - BP well controlled at this time 111/61, -130's over the last few days  -Would add low dose ACE (Lisinopril 5 mg po QD) if BP allows in the setting of recent NSTEMI    -Cardiology will sign off at this time. Please re-consult as needed.  -If consistent with the family wishes / goals of care, Please arrange for the outpatient cardiology follow-up within 4-6 weeks with me at the [facility] by calling [phone].    Tushar Chen MD  Cardiology Attending  [facility]   Cell: [phone]  (Cardiology Nocturnist cell number available 7 pm - 7 am every night; available daytime week days for follow-up only; daytime weekends covered by general cardiology consult service)

## 2018-01-03 NOTE — CHART NOTE - NSCHARTNOTEFT_GEN_A_CORE
Notify by  Tele tech patient with HR 26 while asleep intermittent bradycardic and pause   Patient poor historian. Patient denies chest pain / Sob/ pain     Patient DNR/DNI   Patient 's son healthcare proxy wishes no intervention   Cardiology Note Appreciate  Palliative appreciate/ see note    Vital Signs Last 24 Hrs  T(C): 36.9 (03 Jan 2018 12:14), Max: 36.9 (03 Jan 2018 12:14)  T(F): 98.5 (03 Jan 2018 12:14), Max: 98.5 (03 Jan 2018 12:14)  HR: 52 (03 Jan 2018 12:14) (49 - 68)  BP: 116/67 (03 Jan 2018 12:14) (111/61 - 131/77)  BP(mean): --  RR: 18 (03 Jan 2018 12:14) (18 - 18)  SpO2: 100% (03 Jan 2018 12:14) (93% - 100%)  01-03    138  |  105  |  12  ----------------------------<  97  4.1   |  23  |  0.66    Ca    8.5      03 Jan 2018 07:01  Phos  3.4     01-03  Mg     1.9     01-03        81 yo man with PMH of Afib on Eliquis, HTN, reported CHF?, gout, dementia with reported psychosis presenting from nursing home after an episode of chest pain since this am. Per son, patient was eating breakfast and had episodes of nonbloody, nonbilious emesis of undigested food. Patient was then complaining of left sided chest discomfort on the side: described as a dull pressure. No reported SOB, palpitation. Patient was reported to be given Nitroglycerin tabs x2 without relief and transferred to [facility] for further evaluation. Of note patient mostly bedbound/wheelchair bound and has been at nursing home facility the since 2016. (28 Dec 2017 23:25) Notify by  Tele tech patient with HR 26 while asleep intermittent bradycardic and pause   Patient poor historian. Patient denies chest pain / Sob/ pain     Patient DNR/DNI   Patient 's son healthcare proxy wishes no intervention   Cardiology Note Appreciate  Palliative appreciate/ see note    Vital Signs Last 24 Hrs  T(C): 36.9 (03 Jan 2018 12:14), Max: 36.9 (03 Jan 2018 12:14)  T(F): 98.5 (03 Jan 2018 12:14), Max: 98.5 (03 Jan 2018 12:14)  HR: 52 (03 Jan 2018 12:14) (49 - 68)  BP: 116/67 (03 Jan 2018 12:14) (111/61 - 131/77)  BP(mean): --  RR: 18 (03 Jan 2018 12:14) (18 - 18)  SpO2: 100% (03 Jan 2018 12:14) (93% - 100%)  01-03    138  |  105  |  12  ----------------------------<  97  4.1   |  23  |  0.66    Ca    8.5      03 Jan 2018 07:01  Phos  3.4     01-03  Mg     1.9     01-03        81 yo man with PMH of Afib on Eliquis, HTN, reported CHF?, gout, dementia with reported psychosis presenting from nursing home after an episode of chest pain since this am. Per son, patient was eating breakfast and had episodes of nonbloody, nonbilious emesis of undigested food. Patient was then complaining of left sided chest discomfort on the side: described as a dull pressure. No reported SOB, palpitation. Patient was reported to be given Nitroglycerin tabs x2 without relief and transferred to [facility] for further evaluation. Of note patient mostly bedbound/wheelchair bound and has been at nursing home facility the since 2016. (28 Dec 2017 23:25)  In the ED EKG with AFIB, no significant ST or TW changes, Cardiac enzymes c/w NSTEMI, now downtrended, intermittent pauses noted on telemetry (Asymptomatic, <4 seconds)  -s/p NSTEMI, completed course of Heparin GTT and transitioned to Apixaban  -Intermittent pauses on telemetry (<4 seconds) overnight Notify by  Tele tech patient with HR 26 while asleep intermittent bradycardic and pause   Patient poor historian. Patient denies chest pain / Sob/ pain     Patient DNR/DNI   Patient 's son healthcare proxy wishes no intervention /comfortable care   Cardiology Note Appreciate  Palliative appreciate/ see note    Vital Signs Last 24 Hrs  T(C): 36.9 (03 Jan 2018 12:14), Max: 36.9 (03 Jan 2018 12:14)  T(F): 98.5 (03 Jan 2018 12:14), Max: 98.5 (03 Jan 2018 12:14)  HR: 52 (03 Jan 2018 12:14) (49 - 68)  BP: 116/67 (03 Jan 2018 12:14) (111/61 - 131/77)  BP(mean): --  RR: 18 (03 Jan 2018 12:14) (18 - 18)  SpO2: 100% (03 Jan 2018 12:14) (93% - 100%)  01-03    138  |  105  |  12  ----------------------------<  97  4.1   |  23  |  0.66    Ca    8.5      03 Jan 2018 07:01  Phos  3.4     01-03  Mg     1.9     01-03    83 yo man with PMH of Afib on Eliquis, HTN, reported CHF?, gout, dementia with reported psychosis presenting from nursing home after an episode of chest pain since this am. Per son, patient was eating breakfast and had episodes of nonbloody, nonbilious emesis of undigested food. Patient was then complaining of left sided chest discomfort on the side: described as a dull pressure. No reported SOB, palpitation. Patient was reported to be given Nitroglycerin tabs x2 without relief and transferred to [facility] for further evaluation. Of note patient mostly bedbound/wheelchair bound and has been at nursing home facility the since 2016. (28 Dec 2017 23:25)  In the ED EKG with AFIB, no significant ST or TW changes, Cardiac enzymes c/w NSTEMI, now downtrended, intermittent pauses noted on telemetry (Asymptomatic, <4 seconds)  -s/p NSTEMI, completed course of Heparin GTT and transitioned to Apixaban  -Intermittent pauses on telemetry (<4 seconds) overnight  No indication for PPM at this time. Further, does not appear to be consistent with patient/family wishes.  # still with bradycardia / Afib /intermittent pause   As per Cardiology No indication  for PPM   - continue with Eliquis   - Goal of care discussed with patient son and Palliative appreciate  Hold off Beta blocker / AV blocker   monitor vital sign as routine   Discussed above with Dr Francois agreed with above plan

## 2018-01-03 NOTE — CONSULT NOTE ADULT - SUBJECTIVE AND OBJECTIVE BOX
HPI:  81 yo man with PMH of Afib on Eliquis, HTN, reported CHF?, gout, dementia with reported psychosis presenting from nursing home after an episode of chest pain since this am. Per son, patient was eating breakfast and had episodes of nonbloody, nonbilious emesis of undigested food. Patient was then complaining of left sided chest discomfort on the side: described as a dull pressure. No reported SOB, palpitation. Patient was reported to be given Nitroglycerin tabs x2 without relief and transferred to [facility] for further evaluation. Of note patient mostly bedbound/wheelchair bound and has been at nursing home facility the since 2016. (28 Dec 2017 23:25)      PERTINENT PM/SXH:   Dementia  OA (osteoarthritis)  Obesity  Gout  Afib    H/O right knee surgery    SOCIAL HISTORY:   Significant other/partner:  [x ]YES  [ ]NO*  Children:  x[ ]YES  [ ]NO*  Synagogue/Spirituality: Unknown  Substance hx:  [ ]YES  [ ]NO*  Tobacco hx:  [ ]YES  [ ]NO*  Alcohol hx: [ ]YES  [ ]NO*    Home Opioid hx:  [ ]YES  [ ]NO   Living Situation: [ ]Home  [ ]Long term care  [ ]Rehab [ ]Other    FAMILY HISTORY:  Family history of rectal cancer (Child)    [ ]Family history non-contributory     BASELINE (I)ADLs (prior to admission):  [place]: [ ]total  [ ] moderate [ ]dependent    ADVANCE DIRECTIVES:    DNR Yes    MOLST  [ ]YES [ ]NO                      [ ]Completed  Health Care Proxy [ ]YES  [ ]NO                   [ ]Completed  Living Will  [ ]YES [ ]NO           [ ]Surrogate  [ ]HCP  [ ]Guardian:  Phone#:    Allergies    No Known Allergies    Intolerances      MEDICATIONS  (STANDING):  allopurinol 300 milliGRAM(s) Oral daily  apixaban 5 milliGRAM(s) Oral every 12 hours  aspirin enteric coated 81 milliGRAM(s) Oral daily  atorvastatin 80 milliGRAM(s) Oral at bedtime  clopidogrel Tablet 75 milliGRAM(s) Oral daily  docusate sodium 100 milliGRAM(s) Oral two times a day  risperiDONE   Tablet 0.5 milliGRAM(s) Oral at bedtime  senna 2 Tablet(s) Oral at bedtime  valproic  acid Syrup 125 milliGRAM(s) Oral two times a day  valproic  acid Syrup 250 milliGRAM(s) Oral at bedtime    MEDICATIONS  (PRN):  melatonin 3 milliGRAM(s) Oral at bedtime PRN Insomnia      PRESENT SYMPTOMS:  Source: [ ]Patient   [ ]Family   [ ]Team     Pain:                        [ ]No [ ]Yes             [ ]Mild [ ]Moderate [ ]Severe  Onset -  Location -  Duration -  Character -  Alleviating/Aggravating -  Radiation -  Timing -    Dyspnea:                [ ]No [ ]Yes             [ ]Mild [ ]Moderate [ ]Severe  Anxiety:                  [ ]No [ ]Yes             [ ]Mild [ ]Moderate [ ]Severe  Fatigue:                  [ ]No [ ]Yes             [ ]Mild [ ]Moderate [ ]Severe  Nausea:                  [ ]No [ ]Yes             [ ]Mild [ ]Moderate [ ]Severe  Loss of appetite:   [ ]No [ ]Yes             [ ]Mild [ ]Moderate [ ]Severe  Constipation:        [ ]No [ ]Yes             [ ]Mild [ ]Moderate [ ]Severe    Other Symptoms:  [ ]All other review of systems negative   [ ]Unable to obtain due to poor mentation     Karnofsky Performance Score/Palliative Performance Status Version 2:         %  PHYSICAL EXAM:  Vital Signs Last 24 Hrs  T(C): 36.5 (03 Jan 2018 04:41), Max: 36.7 (02 Jan 2018 12:12)  T(F): 97.7 (03 Jan 2018 04:41), Max: 98 (02 Jan 2018 12:12)  HR: 49 (03 Jan 2018 04:41) (49 - 68)  BP: 111/61 (03 Jan 2018 04:41) (111/61 - 131/77)  BP(mean): --  RR: 18 (03 Jan 2018 04:41) (18 - 18)  SpO2: 97% (03 Jan 2018 04:41) (93% - 97%) I&O's Summary    02 Jan 2018 07:01  -  03 Jan 2018 07:00  --------------------------------------------------------  IN: 1315 mL / OUT: 1 mL / NET: 1314 mL      General:  [ ]Alert  [ ]Oriented x   [ ]Lethargic  [ ]Agitated   [ ]Cachexia   [ ]Unarousable  [ ]Verbal  [ ]Non-Verbal    HEENT:  [ ]Normal   [ ]Dry mouth   [ ]ET Tube/Trach  [ ]Oral lesions  Lungs:   [ ]Clear [ ]Tachypnea  [ ]Audible excessive secretions   [ ]Rhonchi        [ ]Right [ ]Left [ ]Bilateral  [ ]Crackles        [ ]Right [ ]Left [ ]Bilateral  [ ]Wheezing     [ ]Right [ ]Left [ ]Bilateral  Cardiovascular:  [ ]Regular [ ]Irregular [ ]Tachycardia  [ ]Bradycardia  [ ]Murmur [ ]Other  Abdomen: [ ]Soft  [ ]Distended   [ ]+BS  [ ]Non tender [ ]Tender  [ ]PEG/]OGT/ NGT   Last BM:     Genitourinary: [ ]Normal [  Incontinent   [ ]Oliguria/Anuria   [ ]Atkinson  Musculoskeletal:  [ ]Normal   [ ]Weakness  [ ]Bedbound/Wheelchair bound [ ]Edema  Neurological: [ ]No focal deficits  [ ] Cognitive impairment  [ ] Dysphagia [ ]Dysarthria [ ] Paresis [ ]Other     Skin: [ ]Normal   [ ]Pressure ulcer(s)  [ ]Rash    LABS:    01-03    138  |  105  |  12  ----------------------------<  97  4.1   |  23  |  0.66    Ca    8.5      03 Jan 2018 07:01  Phos  3.4     01-03  Mg     1.9     01-03            Shock: [ ]Septic [ ]Cardiogenic [ ]Neurologic [ ]Hypovolemic  Vasopressors x   Inotropes x     Protein Calorie Malnutrition: [ ]Mild [ ]Moderate [ ]Severe    Oral Intake: [ ]Unable/mouth care only [ ]Minimal [ ]Moderate [ ]Full Capability  Diet: [ ]NPO [ ]Tube feeds [ ]TPN [ ]Other     RADIOLOGY & ADDITIONAL STUDIES:    REFERRALS:   [ ]Chaplaincy  [ ] Hospice  [ ]Child Life  [ ]Social Work  [ ]Case management [ ]Holistic Therapy HPI:  83 yo man with PMH of Afib on Eliquis, HTN, reported CHF?, gout, dementia with reported psychosis presenting from nursing home after an episode of chest pain since this am. Per son, patient was eating breakfast and had episodes of nonbloody, nonbilious emesis of undigested food. Patient was then complaining of left sided chest discomfort on the side: described as a dull pressure. No reported SOB, palpitation. Patient was reported to be given Nitroglycerin tabs x2 without relief and transferred to [facility] for further evaluation. Of note patient mostly bedbound/wheelchair bound and has been at nursing home facility the since 2016. (28 Dec 2017 23:25)    Patient + NSTEMI, enzymes trending downward.  At present asymptomatic, offers no complaints    PERTINENT PM/SXH:   Dementia  OA (osteoarthritis)  Obesity  Gout  Afib    H/O right knee surgery    SOCIAL HISTORY:   Significant other/partner:  [x ]YES  [ ]NO*  Children:  x[ ]YES  [ ]NO*  Orthodox/Spirituality: Unknown  Substance hx:  [ ]YES  [xNO*  Tobacco hx:  [ ]YES  [ x]NO*  Alcohol hx: [ ]YES  [x ]NO*    Home Opioid hx:  [x ]YES  [ ]NO   Living Situation: [ ]Home  [x ]Long term care [facility] [ ]Rehab [ ]Other    FAMILY HISTORY:  Family history of rectal cancer (Child)    [x ]Family history non-contributory     BASELINE (I)ADLs (prior to admission):  [place]: [ ]total  [ ] moderate [x ]dependent    ADVANCE DIRECTIVES:    DNR Yes    MOLST  [ ]YES [ ]NO                      [ ]Completed  Health Care Proxy [x ]YES  [ ]NO                   [ ]Completed  Living Will  [ ]YES [ ]NO           [ ]Surrogate  [x ]HCP  [ ]Guardian:  Phone#: Baltazar Mcelroy JrObey [phone]    Allergies    No Known Allergies    Intolerances      MEDICATIONS  (STANDING):  allopurinol 300 milliGRAM(s) Oral daily  apixaban 5 milliGRAM(s) Oral every 12 hours  aspirin enteric coated 81 milliGRAM(s) Oral daily  atorvastatin 80 milliGRAM(s) Oral at bedtime  clopidogrel Tablet 75 milliGRAM(s) Oral daily  docusate sodium 100 milliGRAM(s) Oral two times a day  risperiDONE   Tablet 0.5 milliGRAM(s) Oral at bedtime  senna 2 Tablet(s) Oral at bedtime  valproic  acid Syrup 125 milliGRAM(s) Oral two times a day  valproic  acid Syrup 250 milliGRAM(s) Oral at bedtime    MEDICATIONS  (PRN):  melatonin 3 milliGRAM(s) Oral at bedtime PRN Insomnia      PRESENT SYMPTOMS:  Source: [x ]Patient   [ ]Family   [ ]Team     Pain:                        [x ]No [ ]Yes             [ ]Mild [ ]Moderate [ ]Severe  Onset -  Location -  Duration -  Character -  Alleviating/Aggravating -  Radiation -  Timing -    Dyspnea:                [x ]No [ ]Yes             [ ]Mild [ ]Moderate [ ]Severe  Anxiety:                  [x ]No [ ]Yes             [ ]Mild [ ]Moderate [ ]Severe  Fatigue:                  [x ]No [ ]Yes             [ ]Mild [ ]Moderate [ ]Severe  Nausea:                  [x ]No [ ]Yes             [ ]Mild [ ]Moderate [ ]Severe  Loss of appetite:   [x ]No [ ]Yes             [ ]Mild [ ]Moderate [ ]Severe  Constipation:        [x ]No [ ]Yes             [ ]Mild [ ]Moderate [ ]Severe    Other Symptoms:  [x ]All other review of systems negative   [ ]Unable to obtain due to poor mentation     Karnofsky Performance Score/Palliative Performance Status Version 2:   40      %  PHYSICAL EXAM:  Vital Signs Last 24 Hrs  T(C): 36.5 (03 Jan 2018 04:41), Max: 36.7 (02 Jan 2018 12:12)  T(F): 97.7 (03 Jan 2018 04:41), Max: 98 (02 Jan 2018 12:12)  HR: 49 (03 Jan 2018 04:41) (49 - 68)  BP: 111/61 (03 Jan 2018 04:41) (111/61 - 131/77)  BP(mean): --  RR: 18 (03 Jan 2018 04:41) (18 - 18)  SpO2: 97% (03 Jan 2018 04:41) (93% - 97%) I&O's Summary    02 Jan 2018 07:01  -  03 Jan 2018 07:00  --------------------------------------------------------  IN: 1315 mL / OUT: 1 mL / NET: 1314 mL      General:  [x ]Alert  [s ]Oriented x 1  [ ]Lethargic  [ ]Agitated   [ ]Cachexia   [ ]Unarousable  [x ]Verbal  [ ]Non-Verbal    HEENT:  [x ]Normal   [ ]Dry mouth   [ ]ET Tube/Trach  [ ]Oral lesions  Lungs:   x[ ]Clear [ ]Tachypnea  [ ]Audible excessive secretions   [ ]Rhonchi        [ ]Right [ ]Left [ ]Bilateral  [ ]Crackles        [ ]Right [ ]Left [ ]Bilateral  [ ]Wheezing     [ ]Right [ ]Left [ ]Bilateral  Cardiovascular:  [ ]Regular [x ]Irregular [ ]Tachycardia  [ ]Bradycardia  [ ]Murmur [ ]Other +S1 +S2   Abdomen: [ x]Soft  [ ]Distended   [x ]+BS  [ x]Non tender [ ]Tender  [ ]PEG/]OGT/ NGT   Last BM:   See RN documentation in the EMR which I have reviewed. 1/2/18  Genitourinary: [ ]Normal [x  Incontinent   [ ]Oliguria/Anuria   [ ]Atkinson  Musculoskeletal:  [ ]Normal   [x ]Weakness  [x ]Bedbound/Wheelchair bound [ ]Edema  Neurological: [ ]No focal deficits  [x ] Cognitive impairment  [ ] Dysphagia [ ]Dysarthria [ ] Paresis [ ]Other     Skin: [x ]Normal   [ ]Pressure ulcer(s)  [ ]Rash    LABS:    01-03    138  |  105  |  12  ----------------------------<  97  4.1   |  23  |  0.66    Ca    8.5      03 Jan 2018 07:01  Phos  3.4     01-03  Mg     1.9     01-03      < from: Xray Chest 1 View AP- PORTABLE-Urgent (12.28.17 @ 12:16) >  IMPRESSION:    No acute process.              < end of copied text >    < from: 12 Lead ECG (12.28.17 @ 17:00) >  Diagnosis Line ATRIAL FIBRILLATION  LEFT AXIS DEVIATION  RIGHT BUNDLE BRANCH BLOCK  ANTERIOR INFARCT , AGEUNDETERMINED  ABNORMAL ECG    < end of copied text >      Shock: [ ]Septic [ ]Cardiogenic [ ]Neurologic [ ]Hypovolemic  Vasopressors x   Inotropes x     Protein Calorie Malnutrition: [ ]Mild [ ]Moderate [ ]Severe    Oral Intake: [ ]Unable/mouth care only [ ]Minimal [ ]Moderate [ ]Full Capability  Diet: [ ]NPO [ ]Tube feeds [ ]TPN [ ]Other     RADIOLOGY & ADDITIONAL STUDIES:    REFERRALS:   [ ]Chaplaincy  [ ] Hospice  [ ]Child Life  [ ]Social Work  [ ]Case management [ ]Holistic Therapy

## 2018-01-04 LAB
MAGNESIUM SERPL-MCNC: 1.9 MG/DL — SIGNIFICANT CHANGE UP (ref 1.6–2.6)
PHOSPHATE SERPL-MCNC: 3.4 MG/DL — SIGNIFICANT CHANGE UP (ref 2.5–4.5)

## 2018-01-04 RX ADMIN — Medication 125 MILLIGRAM(S): at 10:05

## 2018-01-04 RX ADMIN — Medication 100 MILLIGRAM(S): at 17:34

## 2018-01-04 RX ADMIN — ATORVASTATIN CALCIUM 80 MILLIGRAM(S): 80 TABLET, FILM COATED ORAL at 21:11

## 2018-01-04 RX ADMIN — APIXABAN 5 MILLIGRAM(S): 2.5 TABLET, FILM COATED ORAL at 10:05

## 2018-01-04 RX ADMIN — APIXABAN 5 MILLIGRAM(S): 2.5 TABLET, FILM COATED ORAL at 21:11

## 2018-01-04 RX ADMIN — CLOPIDOGREL BISULFATE 75 MILLIGRAM(S): 75 TABLET, FILM COATED ORAL at 10:05

## 2018-01-04 RX ADMIN — Medication 300 MILLIGRAM(S): at 10:05

## 2018-01-04 RX ADMIN — RISPERIDONE 0.5 MILLIGRAM(S): 4 TABLET ORAL at 21:11

## 2018-01-04 RX ADMIN — Medication 81 MILLIGRAM(S): at 10:05

## 2018-01-04 RX ADMIN — Medication 250 MILLIGRAM(S): at 21:11

## 2018-01-04 RX ADMIN — Medication 125 MILLIGRAM(S): at 17:34

## 2018-01-04 RX ADMIN — Medication 100 MILLIGRAM(S): at 06:19

## 2018-01-04 RX ADMIN — SENNA PLUS 2 TABLET(S): 8.6 TABLET ORAL at 21:11

## 2018-01-04 NOTE — PROGRESS NOTE ADULT - PROBLEM SELECTOR PLAN 5
Mood has been stable. Mental status at baseline  Continue outpatient regimen of valproic acid and risperdal  Patient nonambulatory at baseline
Continue outpatient regimen of valproic acid and risperdal  Patient nonambulatory at baseline

## 2018-01-04 NOTE — PROGRESS NOTE ADULT - SUBJECTIVE AND OBJECTIVE BOX
Patient is a 82y old  Male who presents with a chief complaint of chest pain (28 Dec 2017 23:25)      SUBJECTIVE / OVERNIGHT EVENTS:   Feels better.  Denies CP/SOB/Palpitation/HA.    MEDICATIONS  (STANDING):  allopurinol 300 milliGRAM(s) Oral daily  apixaban 5 milliGRAM(s) Oral every 12 hours  aspirin enteric coated 81 milliGRAM(s) Oral daily  atorvastatin 80 milliGRAM(s) Oral at bedtime  clopidogrel Tablet 75 milliGRAM(s) Oral daily  docusate sodium 100 milliGRAM(s) Oral two times a day  risperiDONE   Tablet 0.5 milliGRAM(s) Oral at bedtime  senna 2 Tablet(s) Oral at bedtime  valproic  acid Syrup 125 milliGRAM(s) Oral two times a day  valproic  acid Syrup 250 milliGRAM(s) Oral at bedtime    MEDICATIONS  (PRN):  melatonin 3 milliGRAM(s) Oral at bedtime PRN Insomnia        CAPILLARY BLOOD GLUCOSE        I&O's Summary    03 Jan 2018 07:01  -  04 Jan 2018 07:00  --------------------------------------------------------  IN: 1280 mL / OUT: 0 mL / NET: 1280 mL    04 Jan 2018 07:01  -  05 Jan 2018 02:40  --------------------------------------------------------  IN: 690 mL / OUT: 0 mL / NET: 690 mL        PHYSICAL EXAM:  GENERAL: NAD, well-developed  HEAD:  Atraumatic, Normocephalic  NECK: Supple, No JVD  CHEST/LUNG: Clear to auscultation bilaterally; No wheezing.  HEART: Regular rate and rhythm; No murmurs, rubs, or gallops  ABDOMEN: Soft, Nontender, Nondistended; Bowel sounds present  EXTREMITIES:   No clubbing, cyanosis, or edema  NEUROLOGY: Awake.  SKIN: No rashes    LABS:    01-03    138  |  105  |  12  ----------------------------<  97  4.1   |  23  |  0.66    Ca    8.5      03 Jan 2018 07:01  Phos  3.4     01-04  Mg     1.9     01-04              CAPILLARY BLOOD GLUCOSE                    RADIOLOGY & ADDITIONAL TESTS:    Imaging Personally Reviewed:    Consultant(s) Notes Reviewed:      Care Discussed with Consultants/Other Providers:

## 2018-01-04 NOTE — PROGRESS NOTE ADULT - PROBLEM SELECTOR PLAN 4
Patient on Eliquis as outpatient  Will hold for now as patient on heparin gtt
Patient on Eliquis as outpatient  Restart eliquis.

## 2018-01-04 NOTE — PROGRESS NOTE ADULT - PROBLEM SELECTOR PROBLEM 5
Dementia with behavioral disturbance, unspecified dementia type

## 2018-01-04 NOTE — CHART NOTE - NSCHARTNOTEFT_GEN_A_CORE
Source: Patient [x ]    Family [ ]     other [ x]chart  as per Internal Medicine note    Assessment and Plan:   · Assessment	  83 yo man with PMH of Afib on Eliquis, HTN, reported CHF?, gout, dementia with reported psychosis presenting from nursing home after an episode of chest pain with elevated CE concerning for NSTEMI.    Morbid obesity:  Dietary f/up in SNF.     Problem/Plan - 1:  ·  Problem: NSTEMI (non-ST elevated myocardial infarction).  Plan: Medical Mx  Family refused C. Cath.      Problem/Plan - 2:  ·  Problem: Bradycardia.  Plan: Pause on tele.  Palliative care eval noted.  No intervention as per pt's son/HCP.      Problem/Plan - 3:  ·  Problem: Gout.  Plan: C/W allopurinol.      Problem/Plan - 4:  ·  Problem: Afib.  Plan: Patient on Eliquis as outpatient  Restart eliquis.      Problem/Plan - 5:  ·  Problem: Dementia with behavioral disturbance, unspecified dementia type.  Plan: Continue outpatient regimen of valproic acid and risperdal  Patient nonambulatory at baseline.      Problem/Plan - 6:  Problem: Advanced care planning/counseling discussion.    Diet : LOWSODIUM/SOFT      Patient reports [ ] nausea  [ ] vomiting [ ] diarrhea [ ] constipation  [ ]chewing problems [ ] swallowing issues  [X ] other: no issues or concerns related to food as per pt. tired at time of visit, unable to complete interview, he fell back asleep in the middle of the interview. interview questionable secondary to dementia.      PO intake:  < 50% [ ] 50-75% [ ]   % [ ]  other : eating well     Source for PO intake [ x] Patient [ ] family [ ] chart [ ] staff [ ] other     Enteral /Parenteral Nutrition:       Current Weight: 233.4pounds/105.9kg  % Weight Change: stable since previous assess on 12/29    Pertinent Medications: MEDICATIONS  (STANDING):  allopurinol 300 milliGRAM(s) Oral daily  apixaban 5 milliGRAM(s) Oral every 12 hours  aspirin enteric coated 81 milliGRAM(s) Oral daily  atorvastatin 80 milliGRAM(s) Oral at bedtime  clopidogrel Tablet 75 milliGRAM(s) Oral daily  docusate sodium 100 milliGRAM(s) Oral two times a day  risperiDONE   Tablet 0.5 milliGRAM(s) Oral at bedtime  senna 2 Tablet(s) Oral at bedtime  valproic  acid Syrup 125 milliGRAM(s) Oral two times a day  valproic  acid Syrup 250 milliGRAM(s) Oral at bedtime    MEDICATIONS  (PRN):  melatonin 3 milliGRAM(s) Oral at bedtime PRN Insomnia    Pertinent Labs:  01-04 Nan/a   Glu n/a   K+ n/a   Cr  n/a   BUN n/a   Phos 3.4 mg/dL                     Skin: +1 left leg/right leg, intact    Estimated Needs:   [x ] no change since previous assessment  [ ] recalculated:       Previous Nutrition Diagnosis:     [ ] Inadequate Energy Intake [x ]Inadequate Oral Intake [ ] Excessive Energy Intake     [ ] Underweight [ ] Increased Nutrient Needs [ ] Overweight/Obesity     [ ] Altered GI Function [ ] Unintended Weight Loss [ ] Food & Nutrition Related Knowledge Deficit [ ] Malnutrition          Nutrition Diagnosis is [ ] ongoing  [ ] resolved [x ] not applicable-diet advanced since previous assess          New Nutrition Diagnosis: [ ] not applicable  [x] self-monitoring deficit    [ ] Inadequate Protein Energy Intake [ ]Inadequate Oral Intake [ ] Excessive Energy Intake     [ ] Underweight [ ] Increased Nutrient Needs [ ] Overweight/Obesity     [ ] Altered GI Function [ ] Unintended Weight Loss [ ] Food & Nutrition Related Knowledge Deficit[ ] Limited Adherence to nutrition related recommendations [ ] Malnutrition  [ ] other: Free text       Related to: cognitive dysfunction     As evidenced by: dementia     Interventions:     Recommend    [ ] Change Diet To:    [ ] Nutrition Supplement    [ ] Nutrition Support    [x ] Other: continue current diet        Monitoring and Evaluation:     [ x] PO intake [x ] Tolerance to diet prescription [x ] weights [x ] follow up per protocol    [ ] other:

## 2018-01-05 LAB
ANION GAP SERPL CALC-SCNC: 14 MMOL/L — SIGNIFICANT CHANGE UP (ref 5–17)
BUN SERPL-MCNC: 15 MG/DL — SIGNIFICANT CHANGE UP (ref 7–23)
CALCIUM SERPL-MCNC: 8.6 MG/DL — SIGNIFICANT CHANGE UP (ref 8.4–10.5)
CHLORIDE SERPL-SCNC: 104 MMOL/L — SIGNIFICANT CHANGE UP (ref 96–108)
CO2 SERPL-SCNC: 20 MMOL/L — LOW (ref 22–31)
CREAT SERPL-MCNC: 0.68 MG/DL — SIGNIFICANT CHANGE UP (ref 0.5–1.3)
GLUCOSE SERPL-MCNC: 85 MG/DL — SIGNIFICANT CHANGE UP (ref 70–99)
POTASSIUM SERPL-MCNC: 4 MMOL/L — SIGNIFICANT CHANGE UP (ref 3.5–5.3)
POTASSIUM SERPL-SCNC: 4 MMOL/L — SIGNIFICANT CHANGE UP (ref 3.5–5.3)
SODIUM SERPL-SCNC: 138 MMOL/L — SIGNIFICANT CHANGE UP (ref 135–145)

## 2018-01-05 PROCEDURE — 99233 SBSQ HOSP IP/OBS HIGH 50: CPT | Mod: GC

## 2018-01-05 RX ADMIN — Medication 125 MILLIGRAM(S): at 09:18

## 2018-01-05 RX ADMIN — APIXABAN 5 MILLIGRAM(S): 2.5 TABLET, FILM COATED ORAL at 09:19

## 2018-01-05 RX ADMIN — SENNA PLUS 2 TABLET(S): 8.6 TABLET ORAL at 21:38

## 2018-01-05 RX ADMIN — Medication 125 MILLIGRAM(S): at 17:21

## 2018-01-05 RX ADMIN — Medication 100 MILLIGRAM(S): at 17:21

## 2018-01-05 RX ADMIN — RISPERIDONE 0.5 MILLIGRAM(S): 4 TABLET ORAL at 21:38

## 2018-01-05 RX ADMIN — Medication 81 MILLIGRAM(S): at 12:56

## 2018-01-05 RX ADMIN — Medication 250 MILLIGRAM(S): at 21:38

## 2018-01-05 RX ADMIN — Medication 300 MILLIGRAM(S): at 12:56

## 2018-01-05 RX ADMIN — ATORVASTATIN CALCIUM 80 MILLIGRAM(S): 80 TABLET, FILM COATED ORAL at 21:38

## 2018-01-05 RX ADMIN — CLOPIDOGREL BISULFATE 75 MILLIGRAM(S): 75 TABLET, FILM COATED ORAL at 12:56

## 2018-01-05 RX ADMIN — APIXABAN 5 MILLIGRAM(S): 2.5 TABLET, FILM COATED ORAL at 21:38

## 2018-01-05 RX ADMIN — Medication 3 MILLIGRAM(S): at 21:39

## 2018-01-05 RX ADMIN — Medication 100 MILLIGRAM(S): at 05:22

## 2018-01-05 NOTE — PROGRESS NOTE ADULT - PROBLEM SELECTOR PLAN 3
C/W allopurinol
XUAN completed with son which I have signed and placed on the chart.  The plan is to return to [facility] with hospice services if approved.
C/W allopurinol

## 2018-01-05 NOTE — PROGRESS NOTE ADULT - PROBLEM SELECTOR PROBLEM 1
NSTEMI (non-ST elevated myocardial infarction)

## 2018-01-05 NOTE — PROGRESS NOTE ADULT - PROBLEM SELECTOR PLAN 1
Medical management
Medical Mx  Family refused C. Cath.
Medical Mx  Family refused C. Cath.  IV Heparin

## 2018-01-05 NOTE — PROGRESS NOTE ADULT - PROBLEM SELECTOR PROBLEM 2
Bradycardia
Dementia with behavioral disturbance, unspecified dementia type
Bradycardia

## 2018-01-05 NOTE — PROGRESS NOTE ADULT - SUBJECTIVE AND OBJECTIVE BOX
HPI:  83 yo man with PMH of Afib on Eliquis, HTN, reported CHF?, gout, dementia with reported psychosis presenting from nursing home after an episode of chest pain since this am. Per son, patient was eating breakfast and had episodes of nonbloody, nonbilious emesis of undigested food. Patient was then complaining of left sided chest discomfort on the side: described as a dull pressure. No reported SOB, palpitation. Patient was reported to be given Nitroglycerin tabs x2 without relief and transferred to [facility] for further evaluation. Of note patient mostly bedbound/wheelchair bound and has been at nursing home facility the since 2016. (28 Dec 2017 23:25)    Patient + NSTEMI At present asymptomatic, offers no complaints    PERTINENT PM/SXH:   Dementia  OA (osteoarthritis)  Obesity  Gout  Afib    H/O right knee surgery    SOCIAL HISTORY:   Significant other/partner:  [x ]YES  [ ]NO*  Children:  x[ ]YES  [ ]NO*  Amish/Spirituality: Unknown  Substance hx:  [ ]YES  [xNO*  Tobacco hx:  [ ]YES  [ x]NO*  Alcohol hx: [ ]YES  [x ]NO*    Home Opioid hx:  [x ]YES  [ ]NO   Living Situation: [ ]Home  [x ]Long term care [facility] [ ]Rehab [ ]Other    FAMILY HISTORY:  Family history of rectal cancer (Child)    [x ]Family history non-contributory     BASELINE (I)ADLs (prior to admission):  [place]: [ ]total  [ ] moderate [x ]dependent    ADVANCE DIRECTIVES:    DNR Yes    MOLST  [x ]YES [ ]NO                      [x ]Completed  Health Care Proxy [x ]YES  [ ]NO                   [ ]Completed  Living Will  [ ]YES [ ]NO           [ ]Surrogate  [x ]HCP  [ ]Guardian:  Phone#: Baltazar Mcelroy JrObey [phone]    Allergies    No Known Allergies    Intolerances      MEDICATIONS  (STANDING):  allopurinol 300 milliGRAM(s) Oral daily  apixaban 5 milliGRAM(s) Oral every 12 hours  aspirin enteric coated 81 milliGRAM(s) Oral daily  atorvastatin 80 milliGRAM(s) Oral at bedtime  clopidogrel Tablet 75 milliGRAM(s) Oral daily  docusate sodium 100 milliGRAM(s) Oral two times a day  risperiDONE   Tablet 0.5 milliGRAM(s) Oral at bedtime  senna 2 Tablet(s) Oral at bedtime  valproic  acid Syrup 125 milliGRAM(s) Oral two times a day  valproic  acid Syrup 250 milliGRAM(s) Oral at bedtime    MEDICATIONS  (PRN):  melatonin 3 milliGRAM(s) Oral at bedtime PRN Insomnia      PRESENT SYMPTOMS:  Source: [x ]Patient   [ ]Family   [ ]Team     Pain:                        [x ]No [ ]Yes             [ ]Mild [ ]Moderate [ ]Severe  Onset -  Location -  Duration -  Character -  Alleviating/Aggravating -  Radiation -  Timing -    Dyspnea:                [x ]No [ ]Yes             [ ]Mild [ ]Moderate [ ]Severe  Anxiety:                  [x ]No [ ]Yes             [ ]Mild [ ]Moderate [ ]Severe  Fatigue:                  [x ]No [ ]Yes             [ ]Mild [ ]Moderate [ ]Severe  Nausea:                  [x ]No [ ]Yes             [ ]Mild [ ]Moderate [ ]Severe  Loss of appetite:   [x ]No [ ]Yes             [ ]Mild [ ]Moderate [ ]Severe  Constipation:        [x ]No [ ]Yes             [ ]Mild [ ]Moderate [ ]Severe    Other Symptoms:  [x ]All other review of systems negative   [ ]Unable to obtain due to poor mentation     Karnofsky Performance Score/Palliative Performance Status Version 2:   40      %  PHYSICAL EXAM:  Vital Signs Last 24 Hrs  T(C): 36.5 (03 Jan 2018 04:41), Max: 36.7 (02 Jan 2018 12:12)  T(F): 97.7 (03 Jan 2018 04:41), Max: 98 (02 Jan 2018 12:12)  HR: 49 (03 Jan 2018 04:41) (49 - 68)  BP: 111/61 (03 Jan 2018 04:41) (111/61 - 131/77)  BP(mean): --  RR: 18 (03 Jan 2018 04:41) (18 - 18)  SpO2: 97% (03 Jan 2018 04:41) (93% - 97%) I&O's Summary    02 Jan 2018 07:01  -  03 Jan 2018 07:00  --------------------------------------------------------  IN: 1315 mL / OUT: 1 mL / NET: 1314 mL      General:  [x ]Alert  [s ]Oriented x 1  [ ]Lethargic  [ ]Agitated   [ ]Cachexia   [ ]Unarousable  [x ]Verbal  [ ]Non-Verbal    HEENT:  [x ]Normal   [ ]Dry mouth   [ ]ET Tube/Trach  [ ]Oral lesions  Lungs:   x[ ]Clear [ ]Tachypnea  [ ]Audible excessive secretions   [ ]Rhonchi        [ ]Right [ ]Left [ ]Bilateral  [ ]Crackles        [ ]Right [ ]Left [ ]Bilateral  [ ]Wheezing     [ ]Right [ ]Left [ ]Bilateral  Cardiovascular:  [ ]Regular [x ]Irregular [ ]Tachycardia  [ ]Bradycardia  [ ]Murmur [ ]Other +S1 +S2   Abdomen: [ x]Soft  [ ]Distended   [x ]+BS  [ x]Non tender [ ]Tender  [ ]PEG/]OGT/ NGT   Last BM:   See RN documentation in the EMR which I have reviewed. 1/2/18  Genitourinary: [ ]Normal [x  Incontinent   [ ]Oliguria/Anuria   [ ]Atkinson  Musculoskeletal:  [ ]Normal   [x ]Weakness  [x ]Bedbound/Wheelchair bound [ ]Edema  Neurological: [ ]No focal deficits  [x ] Cognitive impairment  [ ] Dysphagia [ ]Dysarthria [ ] Paresis [ ]Other     Skin: [x ]Normal   [ ]Pressure ulcer(s)  [ ]Rash    LABS:    01-03    138  |  105  |  12  ----------------------------<  97  4.1   |  23  |  0.66    Ca    8.5      03 Jan 2018 07:01  Phos  3.4     01-03  Mg     1.9     01-03      < from: Xray Chest 1 View AP- PORTABLE-Urgent (12.28.17 @ 12:16) >  IMPRESSION:    No acute process.              < end of copied text >    < from: 12 Lead ECG (12.28.17 @ 17:00) >  Diagnosis Line ATRIAL FIBRILLATION  LEFT AXIS DEVIATION  RIGHT BUNDLE BRANCH BLOCK  ANTERIOR INFARCT , AGEUNDETERMINED  ABNORMAL ECG    < end of copied text >      Shock: [ ]Septic [ ]Cardiogenic [ ]Neurologic [ ]Hypovolemic  Vasopressors x   Inotropes x     Protein Calorie Malnutrition: [ ]Mild [ ]Moderate [ ]Severe    Oral Intake: [ ]Unable/mouth care only [ ]Minimal [ ]Moderate [ ]Full Capability  Diet: [ ]NPO [ ]Tube feeds [ ]TPN [ ]Other     RADIOLOGY & ADDITIONAL STUDIES:    REFERRALS:   [ ]Chaplaincy  [ ] Hospice  [ ]Child Life  [ ]Social Work  [x ]Case management [ ]Holistic Therapy HPI:  81 yo man with PMH of Afib on Eliquis, HTN, reported CHF?, gout, dementia with reported psychosis presenting from nursing home after an episode of chest pain since this am. Per son, patient was eating breakfast and had episodes of nonbloody, nonbilious emesis of undigested food. Patient was then complaining of left sided chest discomfort on the side: described as a dull pressure. No reported SOB, palpitation. Patient was reported to be given Nitroglycerin tabs x2 without relief and transferred to [facility] for further evaluation. Of note patient mostly bedbound/wheelchair bound and has been at nursing home facility the since 2016. (28 Dec 2017 23:25)    Patient + NSTEMI At present asymptomatic, offers no complaints    PERTINENT PM/SXH:   Dementia  OA (osteoarthritis)  Obesity  Gout  Afib    H/O right knee surgery    SOCIAL HISTORY:   Significant other/partner:  [x ]YES  [ ]NO*  Children:  x[ ]YES  [ ]NO*  Pentecostal/Spirituality: Unknown  Substance hx:  [ ]YES  [xNO*  Tobacco hx:  [ ]YES  [ x]NO*  Alcohol hx: [ ]YES  [x ]NO*    Home Opioid hx:  [x ]YES  [ ]NO   Living Situation: [ ]Home  [x ]Long term care [facility] [ ]Rehab [ ]Other    FAMILY HISTORY:  Family history of rectal cancer (Child)    [x ]Family history non-contributory     BASELINE (I)ADLs (prior to admission):  [place]: [ ]total  [ ] moderate [x ]dependent    ADVANCE DIRECTIVES:    DNR Yes    MOLST  [x ]YES [ ]NO                      [x ]Completed  Health Care Proxy [x ]YES  [ ]NO                   [ ]Completed  Living Will  [ ]YES [ ]NO           [ ]Surrogate  [x ]HCP  [ ]Guardian:  Phone#: Baltazar Mcelroy JrObey [phone]    Allergies    No Known Allergies    Intolerances      MEDICATIONS  (STANDING):  allopurinol 300 milliGRAM(s) Oral daily  apixaban 5 milliGRAM(s) Oral every 12 hours  aspirin enteric coated 81 milliGRAM(s) Oral daily  atorvastatin 80 milliGRAM(s) Oral at bedtime  clopidogrel Tablet 75 milliGRAM(s) Oral daily  docusate sodium 100 milliGRAM(s) Oral two times a day  risperiDONE   Tablet 0.5 milliGRAM(s) Oral at bedtime  senna 2 Tablet(s) Oral at bedtime  valproic  acid Syrup 125 milliGRAM(s) Oral two times a day  valproic  acid Syrup 250 milliGRAM(s) Oral at bedtime    MEDICATIONS  (PRN):  melatonin 3 milliGRAM(s) Oral at bedtime PRN Insomnia      PRESENT SYMPTOMS:  Source: [x ]Patient   [ ]Family   [ ]Team     Pain:                        [x ]No [ ]Yes             [ ]Mild [ ]Moderate [ ]Severe  Onset -  Location -  Duration -  Character -  Alleviating/Aggravating -  Radiation -  Timing -    Dyspnea:                [x ]No [ ]Yes             [ ]Mild [ ]Moderate [ ]Severe  Anxiety:                  [x ]No [ ]Yes             [ ]Mild [ ]Moderate [ ]Severe  Fatigue:                  [x ]No [ ]Yes             [ ]Mild [ ]Moderate [ ]Severe  Nausea:                  [x ]No [ ]Yes             [ ]Mild [ ]Moderate [ ]Severe  Loss of appetite:   [x ]No [ ]Yes             [ ]Mild [ ]Moderate [ ]Severe  Constipation:        [x ]No [ ]Yes             [ ]Mild [ ]Moderate [ ]Severe    Other Symptoms:  [x ]All other review of systems negative   [ ]Unable to obtain due to poor mentation     Karnofsky Performance Score/Palliative Performance Status Version 2:   40      %  PHYSICAL EXAM:  Vital Signs Last 24 Hrs  T(C): 36.8 (05 Jan 2018 12:19), Max: 36.8 (05 Jan 2018 12:19)  T(F): 98.3 (05 Jan 2018 12:19), Max: 98.3 (05 Jan 2018 12:19)  HR: 59 (05 Jan 2018 12:19) (47 - 59)  BP: 144/80 (05 Jan 2018 12:19) (109/56 - 144/80)  BP(mean): --  RR: 17 (05 Jan 2018 12:19) (17 - 18)  SpO2: 98% (05 Jan 2018 12:19) (95% - 98%)    General:  [x ]Alert  [s ]Oriented x 1  [ ]Lethargic  [ ]Agitated   [ ]Cachexia   [ ]Unarousable  [x ]Verbal  [ ]Non-Verbal  HEENT:  [x ]Normal   [ ]Dry mouth   [ ]ET Tube/Trach  [ ]Oral lesions  Lungs:   x[ ]Clear [ ]Tachypnea  [ ]Audible excessive secretions   [ ]Rhonchi        [ ]Right [ ]Left [ ]Bilateral  [ ]Crackles        [ ]Right [ ]Left [ ]Bilateral  [ ]Wheezing     [ ]Right [ ]Left [ ]Bilateral  Cardiovascular:  [ ]Regular [x ]Irregular [ ]Tachycardia  [ ]Bradycardia  [ ]Murmur [ ]Other +S1 +S2   Abdomen: [ x]Soft  [ ]Distended   [x ]+BS  [ x]Non tender [ ]Tender  [ ]PEG/]OGT/ NGT   Last BM:   See RN documentation in the EMR which I have reviewed. 1/2/18  Genitourinary: [ ]Normal [x  Incontinent   [ ]Oliguria/Anuria   [ ]Atkinson  Musculoskeletal:  [ ]Normal   [x ]Weakness  [x ]Bedbound/Wheelchair bound [ ]Edema  Neurological: [ ]No focal deficits  [x ] Cognitive impairment  [ ] Dysphagia [ ]Dysarthria [ ] Paresis [ ]Other     Skin: [x ]Normal   [ ]Pressure ulcer(s)  [ ]Rash    LABS:    01-03    138  |  105  |  12  ----------------------------<  97  4.1   |  23  |  0.66    Ca    8.5      03 Jan 2018 07:01  Phos  3.4     01-03  Mg     1.9     01-03      < from: Xray Chest 1 View AP- PORTABLE-Urgent (12.28.17 @ 12:16) >  IMPRESSION:    No acute process.      < end of copied text >    < from: 12 Lead ECG (12.28.17 @ 17:00) >  Diagnosis Line ATRIAL FIBRILLATION  LEFT AXIS DEVIATION  RIGHT BUNDLE BRANCH BLOCK  ANTERIOR INFARCT , AGEUNDETERMINED  ABNORMAL ECG    < end of copied text >      Shock: [ ]Septic [ ]Cardiogenic [ ]Neurologic [ ]Hypovolemic  Vasopressors x   Inotropes x     Protein Calorie Malnutrition: [ ]Mild [ ]Moderate [ ]Severe    Oral Intake: [ ]Unable/mouth care only [ ]Minimal [ ]Moderate [ ]Full Capability  Diet: [ ]NPO [ ]Tube feeds [ ]TPN [ ]Other     RADIOLOGY & ADDITIONAL STUDIES:    REFERRALS:   [ ]Chaplaincy  [ ] Hospice  [ ]Child Life  [ ]Social Work  [x ]Case management [ ]Holistic Therapy

## 2018-01-05 NOTE — PROGRESS NOTE ADULT - ATTENDING COMMENTS
I have personally seen and examined the patient and completed the note.   20   minutes for advanced care planning discussion separate and in addition to the e and m service provided.  Patient to be discharged from the [facility] team consult service today.  Please call [phone] if we can be of further assistance.

## 2018-01-05 NOTE — PROGRESS NOTE ADULT - SUBJECTIVE AND OBJECTIVE BOX
MEDICINE, PROGRESS NOTE [phone]    RENETTA ARCE 82y [id number]    Patient seen and examined.  Patient is a 82y old  Male who presents with a chief complaint of chest pain (28 Dec 2017 23:25)      PAST MEDICAL & SURGICAL HISTORY:  Dementia  OA (osteoarthritis)  Obesity  Gout  Afib  H/O right knee surgery    MEDICATIONS  (STANDING):  allopurinol 300 milliGRAM(s) Oral daily  apixaban 5 milliGRAM(s) Oral every 12 hours  aspirin enteric coated 81 milliGRAM(s) Oral daily  atorvastatin 80 milliGRAM(s) Oral at bedtime  clopidogrel Tablet 75 milliGRAM(s) Oral daily  docusate sodium 100 milliGRAM(s) Oral two times a day  risperiDONE   Tablet 0.5 milliGRAM(s) Oral at bedtime  senna 2 Tablet(s) Oral at bedtime  valproic  acid Syrup 125 milliGRAM(s) Oral two times a day  valproic  acid Syrup 250 milliGRAM(s) Oral at bedtime    MEDICATIONS  (PRN):  melatonin 3 milliGRAM(s) Oral at bedtime PRN Insomnia    Allergies    No Known Allergies    Intolerances        PHYSICAL EXAM:  Constitutional: NAD  HEENT: Normocephalic, EOMI  Neck:  No JVD  Respiratory: CTA B/L, No wheezes  Cardiovascular: S1, S2, iRRR, + systolic murmur  Gastrointestinal: BS+, soft, NT/ND  Extremities: No peripheral edema  Neurological: AAOX1,      Vital Signs Last 24 Hrs  T(C): 36.8 (05 Jan 2018 12:19), Max: 36.8 (05 Jan 2018 12:19)  T(F): 98.3 (05 Jan 2018 12:19), Max: 98.3 (05 Jan 2018 12:19)  HR: 59 (05 Jan 2018 12:19) (47 - 59)  BP: 144/80 (05 Jan 2018 12:19) (109/56 - 144/80)  BP(mean): --  RR: 17 (05 Jan 2018 12:19) (17 - 18)  SpO2: 98% (05 Jan 2018 12:19) (95% - 98%)  I&O's Summary    04 Jan 2018 07:01  -  05 Jan 2018 07:00  --------------------------------------------------------  IN: 810 mL / OUT: 0 mL / NET: 810 mL    05 Jan 2018 07:01  -  05 Jan 2018 20:00  --------------------------------------------------------  IN: 980 mL / OUT: 0 mL / NET: 980 mL        LABS:    01-05    138  |  104  |  15  ----------------------------<  85  4.0   |  20<L>  |  0.68    Ca    8.6      05 Jan 2018 06:34  Phos  3.4     01-04  Mg     1.9     01-04

## 2018-01-06 LAB
ANION GAP SERPL CALC-SCNC: 14 MMOL/L — SIGNIFICANT CHANGE UP (ref 5–17)
BUN SERPL-MCNC: 15 MG/DL — SIGNIFICANT CHANGE UP (ref 7–23)
CALCIUM SERPL-MCNC: 8.7 MG/DL — SIGNIFICANT CHANGE UP (ref 8.4–10.5)
CHLORIDE SERPL-SCNC: 105 MMOL/L — SIGNIFICANT CHANGE UP (ref 96–108)
CO2 SERPL-SCNC: 20 MMOL/L — LOW (ref 22–31)
CREAT SERPL-MCNC: 0.61 MG/DL — SIGNIFICANT CHANGE UP (ref 0.5–1.3)
GLUCOSE SERPL-MCNC: 81 MG/DL — SIGNIFICANT CHANGE UP (ref 70–99)
HCT VFR BLD CALC: 42.6 % — SIGNIFICANT CHANGE UP (ref 39–50)
HGB BLD-MCNC: 14.1 G/DL — SIGNIFICANT CHANGE UP (ref 13–17)
MCHC RBC-ENTMCNC: 32.6 PG — SIGNIFICANT CHANGE UP (ref 27–34)
MCHC RBC-ENTMCNC: 33 GM/DL — SIGNIFICANT CHANGE UP (ref 32–36)
MCV RBC AUTO: 98.9 FL — SIGNIFICANT CHANGE UP (ref 80–100)
PLATELET # BLD AUTO: 223 K/UL — SIGNIFICANT CHANGE UP (ref 150–400)
POTASSIUM SERPL-MCNC: 4.1 MMOL/L — SIGNIFICANT CHANGE UP (ref 3.5–5.3)
POTASSIUM SERPL-SCNC: 4.1 MMOL/L — SIGNIFICANT CHANGE UP (ref 3.5–5.3)
RBC # BLD: 4.31 M/UL — SIGNIFICANT CHANGE UP (ref 4.2–5.8)
RBC # FLD: 15.4 % — HIGH (ref 10.3–14.5)
SODIUM SERPL-SCNC: 139 MMOL/L — SIGNIFICANT CHANGE UP (ref 135–145)
WBC # BLD: 10.3 K/UL — SIGNIFICANT CHANGE UP (ref 3.8–10.5)
WBC # FLD AUTO: 10.3 K/UL — SIGNIFICANT CHANGE UP (ref 3.8–10.5)

## 2018-01-06 RX ADMIN — Medication 300 MILLIGRAM(S): at 09:19

## 2018-01-06 RX ADMIN — CLOPIDOGREL BISULFATE 75 MILLIGRAM(S): 75 TABLET, FILM COATED ORAL at 09:19

## 2018-01-06 RX ADMIN — Medication 100 MILLIGRAM(S): at 05:51

## 2018-01-06 RX ADMIN — APIXABAN 5 MILLIGRAM(S): 2.5 TABLET, FILM COATED ORAL at 09:19

## 2018-01-06 RX ADMIN — Medication 125 MILLIGRAM(S): at 17:21

## 2018-01-06 RX ADMIN — Medication 100 MILLIGRAM(S): at 17:21

## 2018-01-06 RX ADMIN — ATORVASTATIN CALCIUM 80 MILLIGRAM(S): 80 TABLET, FILM COATED ORAL at 21:09

## 2018-01-06 RX ADMIN — RISPERIDONE 0.5 MILLIGRAM(S): 4 TABLET ORAL at 21:09

## 2018-01-06 RX ADMIN — Medication 250 MILLIGRAM(S): at 21:10

## 2018-01-06 RX ADMIN — APIXABAN 5 MILLIGRAM(S): 2.5 TABLET, FILM COATED ORAL at 21:09

## 2018-01-06 RX ADMIN — Medication 3 MILLIGRAM(S): at 21:09

## 2018-01-06 RX ADMIN — Medication 81 MILLIGRAM(S): at 09:19

## 2018-01-06 RX ADMIN — Medication 125 MILLIGRAM(S): at 05:51

## 2018-01-06 NOTE — PROGRESS NOTE ADULT - SUBJECTIVE AND OBJECTIVE BOX
Patient is a 82y old  Male who presents with a chief complaint of chest pain (28 Dec 2017 23:25)      SUBJECTIVE / OVERNIGHT EVENTS:   Feels better.  Denies CP/SOB/Palpitation/HA.    MEDICATIONS  (STANDING):  allopurinol 300 milliGRAM(s) Oral daily  apixaban 5 milliGRAM(s) Oral every 12 hours  aspirin enteric coated 81 milliGRAM(s) Oral daily  atorvastatin 80 milliGRAM(s) Oral at bedtime  clopidogrel Tablet 75 milliGRAM(s) Oral daily  docusate sodium 100 milliGRAM(s) Oral two times a day  risperiDONE   Tablet 0.5 milliGRAM(s) Oral at bedtime  senna 2 Tablet(s) Oral at bedtime  valproic  acid Syrup 125 milliGRAM(s) Oral two times a day  valproic  acid Syrup 250 milliGRAM(s) Oral at bedtime    MEDICATIONS  (PRN):  melatonin 3 milliGRAM(s) Oral at bedtime PRN Insomnia        CAPILLARY BLOOD GLUCOSE        I&O's Summary    05 Jan 2018 07:01  -  06 Jan 2018 07:00  --------------------------------------------------------  IN: 1220 mL / OUT: 0 mL / NET: 1220 mL    06 Jan 2018 07:01  -  07 Jan 2018 03:36  --------------------------------------------------------  IN: 1020 mL / OUT: 0 mL / NET: 1020 mL        PHYSICAL EXAM:  GENERAL: NAD, well-developed  HEAD:  Atraumatic, Normocephalic  NECK: Supple, No JVD  CHEST/LUNG: Clear to auscultation bilaterally; No wheezing.  HEART: Regular rate and rhythm; No murmurs, rubs, or gallops  ABDOMEN: Soft, Nontender, Nondistended; Bowel sounds present  EXTREMITIES:   No clubbing, cyanosis, or edema  NEUROLOGY: AAO X 3  SKIN: No rashes    LABS:                        14.1   10.3  )-----------( 223      ( 06 Jan 2018 06:46 )             42.6     01-06    139  |  105  |  15  ----------------------------<  81  4.1   |  20<L>  |  0.61    Ca    8.7      06 Jan 2018 06:46              CAPILLARY BLOOD GLUCOSE                    RADIOLOGY & ADDITIONAL TESTS:    Imaging Personally Reviewed:    Consultant(s) Notes Reviewed:      Care Discussed with Consultants/Other Providers:

## 2018-01-07 LAB
ANION GAP SERPL CALC-SCNC: 11 MMOL/L — SIGNIFICANT CHANGE UP (ref 5–17)
BUN SERPL-MCNC: 15 MG/DL — SIGNIFICANT CHANGE UP (ref 7–23)
CALCIUM SERPL-MCNC: 9 MG/DL — SIGNIFICANT CHANGE UP (ref 8.4–10.5)
CHLORIDE SERPL-SCNC: 105 MMOL/L — SIGNIFICANT CHANGE UP (ref 96–108)
CO2 SERPL-SCNC: 23 MMOL/L — SIGNIFICANT CHANGE UP (ref 22–31)
CREAT SERPL-MCNC: 0.68 MG/DL — SIGNIFICANT CHANGE UP (ref 0.5–1.3)
GLUCOSE SERPL-MCNC: 73 MG/DL — SIGNIFICANT CHANGE UP (ref 70–99)
POTASSIUM SERPL-MCNC: 4.1 MMOL/L — SIGNIFICANT CHANGE UP (ref 3.5–5.3)
POTASSIUM SERPL-SCNC: 4.1 MMOL/L — SIGNIFICANT CHANGE UP (ref 3.5–5.3)
SODIUM SERPL-SCNC: 139 MMOL/L — SIGNIFICANT CHANGE UP (ref 135–145)

## 2018-01-07 RX ADMIN — CLOPIDOGREL BISULFATE 75 MILLIGRAM(S): 75 TABLET, FILM COATED ORAL at 12:33

## 2018-01-07 RX ADMIN — APIXABAN 5 MILLIGRAM(S): 2.5 TABLET, FILM COATED ORAL at 09:23

## 2018-01-07 RX ADMIN — Medication 250 MILLIGRAM(S): at 22:50

## 2018-01-07 RX ADMIN — Medication 125 MILLIGRAM(S): at 17:14

## 2018-01-07 RX ADMIN — Medication 300 MILLIGRAM(S): at 12:33

## 2018-01-07 RX ADMIN — Medication 81 MILLIGRAM(S): at 12:33

## 2018-01-07 RX ADMIN — ATORVASTATIN CALCIUM 80 MILLIGRAM(S): 80 TABLET, FILM COATED ORAL at 21:49

## 2018-01-07 RX ADMIN — Medication 125 MILLIGRAM(S): at 06:16

## 2018-01-07 RX ADMIN — APIXABAN 5 MILLIGRAM(S): 2.5 TABLET, FILM COATED ORAL at 21:51

## 2018-01-07 RX ADMIN — Medication 100 MILLIGRAM(S): at 06:16

## 2018-01-07 RX ADMIN — Medication 100 MILLIGRAM(S): at 17:14

## 2018-01-07 RX ADMIN — RISPERIDONE 0.5 MILLIGRAM(S): 4 TABLET ORAL at 21:49

## 2018-01-07 RX ADMIN — SENNA PLUS 2 TABLET(S): 8.6 TABLET ORAL at 21:49

## 2018-01-07 NOTE — PROGRESS NOTE ADULT - SUBJECTIVE AND OBJECTIVE BOX
Patient is a 82y old  Male who presents with a chief complaint of chest pain (28 Dec 2017 23:25)      SUBJECTIVE / OVERNIGHT EVENTS:   Feels better.  Denies CP/SOB/Palpitation/HA.    MEDICATIONS  (STANDING):  allopurinol 300 milliGRAM(s) Oral daily  apixaban 5 milliGRAM(s) Oral every 12 hours  aspirin enteric coated 81 milliGRAM(s) Oral daily  atorvastatin 80 milliGRAM(s) Oral at bedtime  clopidogrel Tablet 75 milliGRAM(s) Oral daily  docusate sodium 100 milliGRAM(s) Oral two times a day  risperiDONE   Tablet 0.5 milliGRAM(s) Oral at bedtime  senna 2 Tablet(s) Oral at bedtime  valproic  acid Syrup 125 milliGRAM(s) Oral two times a day  valproic  acid Syrup 250 milliGRAM(s) Oral at bedtime    MEDICATIONS  (PRN):  melatonin 3 milliGRAM(s) Oral at bedtime PRN Insomnia        CAPILLARY BLOOD GLUCOSE        I&O's Summary    06 Jan 2018 07:01  -  07 Jan 2018 07:00  --------------------------------------------------------  IN: 1020 mL / OUT: 0 mL / NET: 1020 mL    07 Jan 2018 07:01  -  08 Jan 2018 00:18  --------------------------------------------------------  IN: 900 mL / OUT: 0 mL / NET: 900 mL        PHYSICAL EXAM:  GENERAL: NAD, well-developed  HEAD:  Atraumatic, Normocephalic  NECK: Supple, No JVD  CHEST/LUNG: Clear to auscultation bilaterally; No wheezing.  HEART: Regular rate and rhythm; No murmurs, rubs, or gallops  ABDOMEN: Soft, Nontender, Nondistended; Bowel sounds present  EXTREMITIES:   No clubbing, cyanosis, or edema  NEUROLOGY: AAO X 3  SKIN: No rashes    LABS:                        14.1   10.3  )-----------( 223      ( 06 Jan 2018 06:46 )             42.6     01-07    139  |  105  |  15  ----------------------------<  73  4.1   |  23  |  0.68    Ca    9.0      07 Jan 2018 07:16              CAPILLARY BLOOD GLUCOSE                    RADIOLOGY & ADDITIONAL TESTS:    Imaging Personally Reviewed:    Consultant(s) Notes Reviewed:      Care Discussed with Consultants/Other Providers:

## 2018-01-08 RX ADMIN — Medication 250 MILLIGRAM(S): at 21:33

## 2018-01-08 RX ADMIN — Medication 125 MILLIGRAM(S): at 17:26

## 2018-01-08 RX ADMIN — APIXABAN 5 MILLIGRAM(S): 2.5 TABLET, FILM COATED ORAL at 21:32

## 2018-01-08 RX ADMIN — Medication 100 MILLIGRAM(S): at 17:26

## 2018-01-08 RX ADMIN — Medication 81 MILLIGRAM(S): at 09:30

## 2018-01-08 RX ADMIN — ATORVASTATIN CALCIUM 80 MILLIGRAM(S): 80 TABLET, FILM COATED ORAL at 21:32

## 2018-01-08 RX ADMIN — Medication 125 MILLIGRAM(S): at 09:30

## 2018-01-08 RX ADMIN — RISPERIDONE 0.5 MILLIGRAM(S): 4 TABLET ORAL at 21:32

## 2018-01-08 RX ADMIN — Medication 300 MILLIGRAM(S): at 09:30

## 2018-01-08 RX ADMIN — SENNA PLUS 2 TABLET(S): 8.6 TABLET ORAL at 21:32

## 2018-01-08 RX ADMIN — Medication 100 MILLIGRAM(S): at 06:24

## 2018-01-08 RX ADMIN — APIXABAN 5 MILLIGRAM(S): 2.5 TABLET, FILM COATED ORAL at 09:30

## 2018-01-08 RX ADMIN — CLOPIDOGREL BISULFATE 75 MILLIGRAM(S): 75 TABLET, FILM COATED ORAL at 09:30

## 2018-01-08 NOTE — PROGRESS NOTE ADULT - ASSESSMENT
82 year old nhr from [facility], with dementia, psychosis and now chest pain, + NSTEMI, course notable for pauses in rhythm and NS VT.  We are asked to follow up with family re: LUIS.
81 yo man with PMH of Afib on Eliquis, HTN, reported CHF?, gout, dementia with reported psychosis presenting from nursing home after an episode of chest pain with elevated CE concerning for NSTEMI.    Morbid obesity:  Dietary f/up in SNF.
81 yo man with PMH of Afib on Eliquis, HTN, reported CHF?, gout, dementia with reported psychosis presenting from nursing home after an episode of chest pain with elevated CE concerning for NSTEMI.    Morbid obesity:  Dietary f/up in SNF.
83 yo man with PMH of Afib on Eliquis, HTN, reported CHF?, gout, dementia with reported psychosis presenting from nursing home after an episode of chest pain with elevated CE concerning for NSTEMI.    Morbid obesity:  Dietary f/up in SNF.
83 yo man with PMH of Afib on Eliquis, HTN, reported CHF?, gout, dementia with reported psychosis presenting from nursing home after an episode of chest pain with elevated CE concerning for NSTEMI.    Morbid obesity:  Dietary f/up in SNF.
Morbid obesity:  Dietary f/up in SNF.     Problem/Plan - 1:  ·  Problem: NSTEMI (non-ST elevated myocardial infarction).  Plan: Medical Mx  Family refused C. Cath.      Problem/Plan - 2:  ·  Problem: Bradycardia.  Plan: Pause on tele.  Palliative care eval noted.  No intervention as per pt's son/HCP.      Problem/Plan - 3:  ·  Problem: Gout.  Plan: C/W allopurinol.      Problem/Plan - 4:  ·  Problem: Afib.  Plan: Patient on Eliquis as outpatient  Restart eliquis.      Problem/Plan - 5:  ·  Problem: Dementia with behavioral disturbance, unspecified dementia type.  Plan: Continue outpatient regimen of valproic acid and risperdal  Patient nonambulatory at baseline.     continue current car  appreciate palliative input.
Morbid obesity:  Dietary f/up in SNF.     Problem/Plan - 1:  ·  Problem: NSTEMI (non-ST elevated myocardial infarction).  Plan: Medical Mx  Family refused C. Cath.      Problem/Plan - 2:  ·  Problem: Bradycardia.  Plan: Pause on tele.  Palliative care eval noted.  No intervention as per pt's son/HCP.      Problem/Plan - 3:  ·  Problem: Gout.  Plan: C/W allopurinol.      Problem/Plan - 4:  ·  Problem: Afib.  Plan: Patient on Eliquis as outpatient  Restart eliquis.      Problem/Plan - 5:  ·  Problem: Dementia with behavioral disturbance, unspecified dementia type.  Plan: Continue outpatient regimen of valproic acid and risperdal  Patient nonambulatory at baseline.     continue current car  appreciate palliative input.  Dc planning. Hospice f/up in SNF.
83 yo man with PMH of Afib on Eliquis, HTN, reported CHF?, gout, dementia with reported psychosis presenting from nursing home after an episode of chest pain with elevated CE concerning for NSTEMI.    Morbid obesity:  Dietary f/up in SNF.
81 yo man with PMH of Afib on Eliquis, HTN, reported CHF?, gout, dementia with reported psychosis presenting from nursing home after an episode of chest pain with elevated CE concerning for NSTEMI.    Morbid obesity:  Dietary f/up in SNF.
81 yo man with PMH of Afib on Eliquis, HTN, reported CHF?, gout, dementia with reported psychosis presenting from nursing home after an episode of chest pain with elevated CE concerning for NSTEMI.    Morbid obesity:  Dietary f/up in SNF.

## 2018-01-08 NOTE — ADVANCED PRACTICE NURSE CONSULT - ASSESSMENT
Met with pt and son Baltazar.  Pt's son expects pt to return to [facility] on 1/9, confirmed with KEJNI Doyle RN.  Explained hospice services in SNF to pt's son.  He is interested in hospice care but reluctant to start it before pt has returned to SNF and he has spoken with staff there.  He stated he is happy with the care the pt is receiving in the SNF and does not want to make any changes.  He took admission folder and consents for review.  Will follow up with pt/son on 1/9.  KENJI Doyle RN made aware.

## 2018-01-08 NOTE — PROGRESS NOTE ADULT - SUBJECTIVE AND OBJECTIVE BOX
Patient is a 82y old  Male who presents with a chief complaint of chest pain (28 Dec 2017 23:25)      SUBJECTIVE / OVERNIGHT EVENTS:   Feels better.  Denies CP/SOB/Palpitation/HA.    MEDICATIONS  (STANDING):  allopurinol 300 milliGRAM(s) Oral daily  apixaban 5 milliGRAM(s) Oral every 12 hours  aspirin enteric coated 81 milliGRAM(s) Oral daily  atorvastatin 80 milliGRAM(s) Oral at bedtime  clopidogrel Tablet 75 milliGRAM(s) Oral daily  docusate sodium 100 milliGRAM(s) Oral two times a day  risperiDONE   Tablet 0.5 milliGRAM(s) Oral at bedtime  senna 2 Tablet(s) Oral at bedtime  valproic  acid Syrup 125 milliGRAM(s) Oral two times a day  valproic  acid Syrup 250 milliGRAM(s) Oral at bedtime    MEDICATIONS  (PRN):  melatonin 3 milliGRAM(s) Oral at bedtime PRN Insomnia        CAPILLARY BLOOD GLUCOSE        I&O's Summary    07 Jan 2018 07:01  -  08 Jan 2018 07:00  --------------------------------------------------------  IN: 900 mL / OUT: 0 mL / NET: 900 mL    08 Jan 2018 07:01  -  08 Jan 2018 22:56  --------------------------------------------------------  IN: 300 mL / OUT: 0 mL / NET: 300 mL        PHYSICAL EXAM:  GENERAL: NAD, well-developed  HEAD:  Atraumatic, Normocephalic  NECK: Supple, No JVD  CHEST/LUNG: Clear to auscultation bilaterally; No wheezing.  HEART: Regular rate and rhythm; No murmurs, rubs, or gallops  ABDOMEN: Soft, Nontender, Nondistended; Bowel sounds present  EXTREMITIES:   No clubbing, cyanosis, or edema  NEUROLOGY: AAO   SKIN: No rashes    LABS:    01-07    139  |  105  |  15  ----------------------------<  73  4.1   |  23  |  0.68    Ca    9.0      07 Jan 2018 07:16              CAPILLARY BLOOD GLUCOSE                    RADIOLOGY & ADDITIONAL TESTS:    Imaging Personally Reviewed:    Consultant(s) Notes Reviewed:      Care Discussed with Consultants/Other Providers:

## 2018-01-08 NOTE — PROGRESS NOTE ADULT - PROVIDER SPECIALTY LIST ADULT
Cardiology
Internal Medicine
Palliative Care
Internal Medicine

## 2018-01-09 ENCOUNTER — TRANSCRIPTION ENCOUNTER (OUTPATIENT)
Age: 83
End: 2018-01-09

## 2018-01-09 VITALS
HEART RATE: 63 BPM | TEMPERATURE: 98 F | OXYGEN SATURATION: 95 % | RESPIRATION RATE: 18 BRPM | DIASTOLIC BLOOD PRESSURE: 67 MMHG | SYSTOLIC BLOOD PRESSURE: 111 MMHG

## 2018-01-09 PROCEDURE — 84484 ASSAY OF TROPONIN QUANT: CPT

## 2018-01-09 PROCEDURE — 36415 COLL VENOUS BLD VENIPUNCTURE: CPT

## 2018-01-09 PROCEDURE — 71045 X-RAY EXAM CHEST 1 VIEW: CPT

## 2018-01-09 PROCEDURE — 93005 ELECTROCARDIOGRAM TRACING: CPT

## 2018-01-09 PROCEDURE — 85027 COMPLETE CBC AUTOMATED: CPT

## 2018-01-09 PROCEDURE — 93306 TTE W/DOPPLER COMPLETE: CPT

## 2018-01-09 PROCEDURE — 83735 ASSAY OF MAGNESIUM: CPT

## 2018-01-09 PROCEDURE — 84100 ASSAY OF PHOSPHORUS: CPT

## 2018-01-09 PROCEDURE — 80053 COMPREHEN METABOLIC PANEL: CPT

## 2018-01-09 PROCEDURE — 82550 ASSAY OF CK (CPK): CPT

## 2018-01-09 PROCEDURE — 94640 AIRWAY INHALATION TREATMENT: CPT

## 2018-01-09 PROCEDURE — 82553 CREATINE MB FRACTION: CPT

## 2018-01-09 PROCEDURE — 99285 EMERGENCY DEPT VISIT HI MDM: CPT | Mod: 25

## 2018-01-09 PROCEDURE — 85730 THROMBOPLASTIN TIME PARTIAL: CPT

## 2018-01-09 PROCEDURE — 80048 BASIC METABOLIC PNL TOTAL CA: CPT

## 2018-01-09 RX ORDER — SENNA PLUS 8.6 MG/1
2 TABLET ORAL
Qty: 0 | Refills: 0 | COMMUNITY
Start: 2018-01-09

## 2018-01-09 RX ORDER — VALPROIC ACID (AS SODIUM SALT) 250 MG/5ML
2.5 SOLUTION, ORAL ORAL
Qty: 0 | Refills: 0 | COMMUNITY

## 2018-01-09 RX ORDER — ALLOPURINOL 300 MG
1 TABLET ORAL
Qty: 0 | Refills: 0 | COMMUNITY
Start: 2018-01-09

## 2018-01-09 RX ORDER — LANOLIN ALCOHOL/MO/W.PET/CERES
1 CREAM (GRAM) TOPICAL
Qty: 0 | Refills: 0 | COMMUNITY
Start: 2018-01-09

## 2018-01-09 RX ORDER — CLOPIDOGREL BISULFATE 75 MG/1
1 TABLET, FILM COATED ORAL
Qty: 0 | Refills: 0 | COMMUNITY
Start: 2018-01-09

## 2018-01-09 RX ORDER — DOCUSATE SODIUM 100 MG
1 CAPSULE ORAL
Qty: 0 | Refills: 0 | COMMUNITY
Start: 2018-01-09

## 2018-01-09 RX ORDER — VALPROIC ACID (AS SODIUM SALT) 250 MG/5ML
5 SOLUTION, ORAL ORAL
Qty: 0 | Refills: 0 | COMMUNITY

## 2018-01-09 RX ORDER — VALPROIC ACID (AS SODIUM SALT) 250 MG/5ML
2.5 SOLUTION, ORAL ORAL
Qty: 0 | Refills: 0 | COMMUNITY
Start: 2018-01-09

## 2018-01-09 RX ORDER — RISPERIDONE 4 MG/1
1 TABLET ORAL
Qty: 0 | Refills: 0 | COMMUNITY
Start: 2018-01-09

## 2018-01-09 RX ORDER — APIXABAN 2.5 MG/1
1 TABLET, FILM COATED ORAL
Qty: 0 | Refills: 0 | COMMUNITY
Start: 2018-01-09

## 2018-01-09 RX ORDER — ASPIRIN/CALCIUM CARB/MAGNESIUM 324 MG
1 TABLET ORAL
Qty: 0 | Refills: 0 | COMMUNITY
Start: 2018-01-09

## 2018-01-09 RX ORDER — ATORVASTATIN CALCIUM 80 MG/1
1 TABLET, FILM COATED ORAL
Qty: 0 | Refills: 0 | COMMUNITY
Start: 2018-01-09

## 2018-01-09 RX ORDER — VALPROIC ACID (AS SODIUM SALT) 250 MG/5ML
5 SOLUTION, ORAL ORAL
Qty: 0 | Refills: 0 | COMMUNITY
Start: 2018-01-09

## 2018-01-09 RX ADMIN — Medication 81 MILLIGRAM(S): at 09:30

## 2018-01-09 RX ADMIN — CLOPIDOGREL BISULFATE 75 MILLIGRAM(S): 75 TABLET, FILM COATED ORAL at 09:30

## 2018-01-09 RX ADMIN — Medication 125 MILLIGRAM(S): at 09:30

## 2018-01-09 RX ADMIN — Medication 300 MILLIGRAM(S): at 09:30

## 2018-01-09 RX ADMIN — APIXABAN 5 MILLIGRAM(S): 2.5 TABLET, FILM COATED ORAL at 09:30

## 2018-01-09 RX ADMIN — Medication 100 MILLIGRAM(S): at 05:22

## 2018-01-09 NOTE — DISCHARGE NOTE ADULT - CARE PLAN
Principal Discharge DX:	NSTEMI (non-ST elevated myocardial infarction)  Secondary Diagnosis:	Bradycardia  Secondary Diagnosis:	Chronic atrial fibrillation  Secondary Diagnosis:	Gout, unspecified cause, unspecified chronicity, unspecified site  Secondary Diagnosis:	Dementia with behavioral disturbance, unspecified dementia type  Secondary Diagnosis:	OA (osteoarthritis) Principal Discharge DX:	NSTEMI (non-ST elevated myocardial infarction)  Goal:	stable  Instructions for follow-up, activity and diet:	medical management   patient's son refused intervention  /cardiac cath  Secondary Diagnosis:	Bradycardia  Instructions for follow-up, activity and diet:	Hold Beta Blocker  Secondary Diagnosis:	Chronic atrial fibrillation  Instructions for follow-up, activity and diet:	continue with Apixaban   monitor CBC  Secondary Diagnosis:	Gout, unspecified cause, unspecified chronicity, unspecified site  Instructions for follow-up, activity and diet:	continue with Allopurinol  Secondary Diagnosis:	Dementia with behavioral disturbance, unspecified dementia type  Secondary Diagnosis:	OA (osteoarthritis)  Instructions for follow-up, activity and diet:	continue current medication

## 2018-01-09 NOTE — CHART NOTE - NSCHARTNOTEFT_GEN_A_CORE
Patient is a 82y old  Male who presents with a chief complaint of c/o chest pain (09 Jan 2018 10:59)      Vital Signs Last 24 Hrs  T(C): 36.7 (09 Jan 2018 12:25), Max: 37.1 (09 Jan 2018 12:03)  T(F): 98 (09 Jan 2018 12:25), Max: 98.8 (09 Jan 2018 12:03)  HR: 63 (09 Jan 2018 12:25) (63 - 79)  BP: 111/67 (09 Jan 2018 12:25) (111/67 - 120/70)  BP(mean): --  RR: 18 (09 Jan 2018 12:25) (18 - 18)  SpO2: 95% (09 Jan 2018 12:25) (95% - 97%)      Labs:                    Radiology:    Physical Exam:  General: WN/WD NAD  Neurology: A&Ox3, nonfocal, CENTENO x 4  Head:  Normocephalic, atraumatic  Respiratory: CTA B/L  CV: RRR, S1S2, no murmur  Abdominal: Soft, NT, ND no palpable mass  MSK: No edema, + peripheral pulses, FROM all 4 extremity    Discharge Note and Plan: Discussed with Dr Francois patient medically stable for discharge   Discussed with Wolf alston with hospice needs     >Follow up with   >  >  >

## 2018-01-09 NOTE — DISCHARGE NOTE ADULT - CARE PROVIDER_API CALL
Jomar Francois), Internal Medicine  [address] [place] [address]  Phone: [phone]  Fax: [phone]

## 2018-01-09 NOTE — DISCHARGE NOTE ADULT - MEDICATION SUMMARY - MEDICATIONS TO STOP TAKING
I will STOP taking the medications listed below when I get home from the hospital:    cholecalciferol oral tablet  -- 1000 unit(s) by mouth once a day    furosemide 20 mg oral tablet  -- 1 tab(s) by mouth once a day

## 2018-01-09 NOTE — DISCHARGE NOTE ADULT - HOSPITAL COURSE
Morbid obesity:  Dietary f/up in SNF.   NSTEMI (non-ST elevated myocardial infarction).  Plan: Medical Mx  Family refused C. Cath.   : Bradycardia.  Plan: Pause on tele.  Palliative care eval noted.  No intervention as per pt's son/HCP.   Gout.  Plan: C/W allopurinol.    Afib.  Plan: Patient on Eliquis as outpatient  Restart eliquis.   : Dementia with behavioral disturbance, unspecified dementia type.  Plan: Continue outpatient regimen of valproic acid and risperdal  Patient nonambulatory at baseline.   continue current car  appreciate palliative input. 81 yo man with PMH of Afib on Eliquis, HTN, reported CHF?, gout, dementia with reported psychosis presenting from nursing home after an episode of chest pain since this am. Per son, patient was eating breakfast and had episodes of nonbloody, nonbilious emesis of undigested food. Patient was then complaining of left sided chest discomfort on the side: described as a dull pressure. No reported SOB, palpitation. Patient was reported to be given Nitroglycerin tabs x2 without relief and transferred to [facility] for further evaluation. Of note patient mostly bedbound/wheelchair bound and has been at nursing home facility the since 2016. (28 Dec 2017 23:25)  Patient + NSTEMI At present asymptomatic, offers no complaint  Morbid obesity:  Dietary f/up in SNF.   NSTEMI (non-ST elevated myocardial infarction).  Plan: Medical Mx  Family refused C. Cath.   : Bradycardia.  Plan: Pause on tele.  Palliative care eval noted.  No intervention as per pt's son/HCP.   Gout.  Plan: C/W allopurinol.    Afib.  Plan: Patient on Eliquis as outpatient  Restart eliquis.   : Dementia with behavioral disturbance, unspecified dementia type.  Plan: Continue outpatient regimen of valproic acid and risperdal  Patient nonambulatory at baseline.   continue current car  appreciate palliative input.

## 2018-01-09 NOTE — DISCHARGE NOTE ADULT - SECONDARY DIAGNOSIS.
Bradycardia Chronic atrial fibrillation Gout, unspecified cause, unspecified chronicity, unspecified site Dementia with behavioral disturbance, unspecified dementia type OA (osteoarthritis)

## 2018-01-09 NOTE — DISCHARGE NOTE ADULT - PLAN OF CARE
stable medical management   patient's son refused intervention  /cardiac cath Hold Beta Blocker continue with Apixaban   monitor CBC continue with Allopurinol continue current medication

## 2018-01-09 NOTE — DISCHARGE NOTE ADULT - MEDICATION SUMMARY - MEDICATIONS TO TAKE
I will START or STAY ON the medications listed below when I get home from the hospital:    aspirin 81 mg oral delayed release tablet  -- 1 tab(s) by mouth once a day  -- Indication: For Non-ST elevation (NSTEMI) myocardial infarction    apixaban 5 mg oral tablet  -- 1 tab(s) by mouth every 12 hours  -- Indication: For Afib    valproic acid 250 mg/5 mL oral syrup  -- 2.5 milliliter(s) by mouth 2 times a day  -- Indication: For seizure     valproic acid 250 mg/5 mL oral syrup  -- 5 milliliter(s) by mouth once a day (at bedtime)  -- Indication: For seizure    allopurinol 300 mg oral tablet  -- 1 tab(s) by mouth once a day  -- Indication: For Gout    atorvastatin 80 mg oral tablet  -- 1 tab(s) by mouth once a day (at bedtime)  -- Indication: For high cholestrol     clopidogrel 75 mg oral tablet  -- 1 tab(s) by mouth once a day  -- Indication: For Non-ST elevation (NSTEMI) myocardial infarction    risperiDONE 0.5 mg oral tablet  -- 1 tab(s) by mouth once a day (at bedtime)  -- Indication: For Depression     senna oral tablet  -- 2 tab(s) by mouth once a day (at bedtime)  -- Indication: For Constipation     docusate sodium 100 mg oral capsule  -- 1 cap(s) by mouth 2 times a day  -- Indication: For Constipation     melatonin 3 mg oral tablet  -- 1 tab(s) by mouth once a day (at bedtime), As needed, Insomnia  -- Indication: For insomnia

## 2018-09-25 NOTE — ED ADULT TRIAGE NOTE - MEANS OF ARRIVAL
stretcher Complex Repair And Ftsg Text: The defect edges were debeveled with a #15 scalpel blade.  The primary defect was closed partially with a complex linear closure.  Given the location of the defect, shape of the defect and the proximity to free margins a full thickness skin graft was deemed most appropriate to repair the remaining defect.  The graft was trimmed to fit the size of the remaining defect.  The graft was then placed in the primary defect, oriented appropriately, and sutured into place.

## 2019-04-05 NOTE — ED PROVIDER NOTE - MEDICAL DECISION MAKING DETAILS
Patient is returning your call   83 y/o M pt with PMHx of dementia presents to the ED from nursing home for reported CP. CP is not reproducible with no other associated sx.  Plan: labs and XR for eval

## 2020-10-20 NOTE — PATIENT PROFILE ADULT. - FUNCTIONAL SCREEN CURRENT LEVEL: EATING, MLM
CT concerning for slow process as lesion in same posterior upper lobe lung fields noted on previous CT in 2018. Pt without signs or symptoms of sepsis making acute bacterial cavitary PNA unlikely. Potential for malignancy possible. Pt showing signs of pulmonary htn on echo as well as engorged pulmonary artery and veins. This along with NOAC make bronch unsafe due to increased bleeding in patient with acute respiratory failure.     - CT chest showing cavitary lesion with surround consolidation/ggo concerning for infectious process; obtaining sputum for respiratory cultures and Mycobacterial culture/smear  - repeating quant gold as indeterminate due to poor test as opposed to difficult to determine results.  - Obtain HIV.        (0) independent

## 2021-09-10 NOTE — ED ADULT NURSE NOTE - EXTENSIONS OF SELF_ADULT
"concerns Mon-Fri 8am-4pm:   [phone]  For concerns after hours:   [phone] option 2   Scheduling phone number:   [phone]    Thank you for visiting with us today.   Chandrika Haque PA-C  ______________________________________________________________  " None

## 2022-09-27 NOTE — DIETITIAN INITIAL EVALUATION ADULT. - NS FNS WEIGHT USED FOR CALC
dosing [We should proceed with our protocol for kidney transplantation evaluation.] : We should proceed with our protocol for kidney transplantation evaluation.

## 2023-06-05 NOTE — PATIENT PROFILE ADULT. - NSALCOHOLUSECOMMENT_GEN_ALL_CORE_FT
Show Applicator Variable?: Yes
Application Tool (Optional): Liquid Nitrogen Sprayer
Duration Of Freeze Thaw-Cycle (Seconds): 1
Post-Care Instructions: I reviewed with the patient in detail post-care instructions. Patient is to wear sunprotection, and avoid picking at any of the treated lesions. Pt may apply Vaseline to crusted or scabbing areas.
Number Of Freeze-Thaw Cycles: 3 freeze-thaw cycles
Detail Level: Detailed
Render Note In Bullet Format When Appropriate: No
Consent: The patient's consent was obtained including but not limited to risks of crusting, scabbing, blistering, scarring, darker or lighter pigmentary change, recurrence, incomplete removal and infection.
occasional

## 2023-08-02 NOTE — ED PROVIDER NOTE - CADM POA PRESS ULCER
"Chief Complaint   Patient presents with    Cough         Initial BP (!) 154/99 (BP Location: Left arm, Patient Position: Sitting, Cuff Size: Adult Regular)   Pulse 66   Temp 97.3  F (36.3  C) (Temporal)   Resp 20   Ht 1.651 m (5' 5\")   Wt 83.9 kg (184 lb 14.4 oz)   SpO2 99%   BMI 30.77 kg/m   Estimated body mass index is 30.77 kg/m  as calculated from the following:    Height as of this encounter: 1.651 m (5' 5\").    Weight as of this encounter: 83.9 kg (184 lb 14.4 oz).     Advance Care Directive on file? no  Advance Care Directive provided to patient? no    FOOD SECURITY SCREENING QUESTIONS:    The next two questions are to help us understand your food security.  If you are feeling you need any assistance in this area, we have resources available to support you today.    Hunger Vital Signs:  Within the past 12 months we worried whether our food would run out before we got money to buy more. Never  Within the past 12 months the food we bought just didn't last and we didn't have money to get more. Never  Selina Guerra LPN on 8/2/2023 at 11:53 AM      Selina Guerra     " No

## 2025-01-29 NOTE — DIETITIAN INITIAL EVALUATION ADULT. - PROBLEM/PLAN-1
Dear Barby Pyle,        Thank you for coming to your appointment today. I hope we have addressed all of your needs.       Please make sure to do the following:  - Continue your medications as listed.  - Get labs drawn before our next follow up.  - We will see each other again in 6 months.    Call for a sooner appointment if you develop chest pain, shortness of breath, worsening headache, or abdominal pain.    Have a great day!        Sincerely,  Terrence Chapman MD  1/29/2025  9:10 AM    
DISPLAY PLAN FREE TEXT